# Patient Record
Sex: MALE | Race: WHITE
[De-identification: names, ages, dates, MRNs, and addresses within clinical notes are randomized per-mention and may not be internally consistent; named-entity substitution may affect disease eponyms.]

---

## 2017-05-15 ENCOUNTER — HOSPITAL ENCOUNTER (OUTPATIENT)
Dept: HOSPITAL 53 - M ED | Age: 82
Discharge: HOME | End: 2017-05-15
Attending: SURGERY
Payer: COMMERCIAL

## 2017-05-15 VITALS — HEIGHT: 69 IN | WEIGHT: 170 LBS | BODY MASS INDEX: 25.18 KG/M2

## 2017-05-15 VITALS — SYSTOLIC BLOOD PRESSURE: 190 MMHG | DIASTOLIC BLOOD PRESSURE: 86 MMHG

## 2017-05-15 DIAGNOSIS — T18.128A: Primary | ICD-10-CM
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
## 2017-05-15 DIAGNOSIS — Z88.8: ICD-10-CM

## 2017-05-15 DIAGNOSIS — E11.9: ICD-10-CM

## 2017-05-15 DIAGNOSIS — Z79.82: ICD-10-CM

## 2017-05-15 DIAGNOSIS — I73.9: ICD-10-CM

## 2017-05-15 DIAGNOSIS — K26.9: ICD-10-CM

## 2017-05-15 DIAGNOSIS — Z87.891: ICD-10-CM

## 2017-05-15 DIAGNOSIS — E78.5: ICD-10-CM

## 2017-05-15 DIAGNOSIS — G62.9: ICD-10-CM

## 2017-05-15 DIAGNOSIS — Z79.02: ICD-10-CM

## 2017-05-15 DIAGNOSIS — E07.9: ICD-10-CM

## 2017-05-15 DIAGNOSIS — I25.10: ICD-10-CM
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
## 2017-05-15 DIAGNOSIS — Z79.899: ICD-10-CM

## 2017-05-15 DIAGNOSIS — K22.10: ICD-10-CM

## 2017-05-15 DIAGNOSIS — I10: ICD-10-CM

## 2017-05-15 LAB
ANION GAP SERPL CALC-SCNC: 9 MEQ/L (ref 8–16)
BASOPHILS # BLD AUTO: 0.1 K/MM3 (ref 0–0.2)
BASOPHILS NFR BLD AUTO: 0.7 % (ref 0–1)
BUN SERPL-MCNC: 30 MG/DL (ref 7–18)
CALCIUM SERPL-MCNC: 8.9 MG/DL (ref 8.8–10.2)
CHLORIDE SERPL-SCNC: 103 MEQ/L (ref 98–107)
CO2 SERPL-SCNC: 30 MEQ/L (ref 21–32)
CREAT SERPL-MCNC: 1.38 MG/DL (ref 0.7–1.3)
EOSINOPHIL # BLD AUTO: 0.2 K/MM3 (ref 0–0.5)
EOSINOPHIL NFR BLD AUTO: 1.8 % (ref 0–3)
ERYTHROCYTE [DISTWIDTH] IN BLOOD BY AUTOMATED COUNT: 13.7 % (ref 11.5–14.5)
GFR SERPL CREATININE-BSD FRML MDRD: 52.1 ML/MIN/{1.73_M2} (ref 35–?)
GLUCOSE SERPL-MCNC: 191 MG/DL (ref 83–110)
LARGE UNSTAINED CELL #: 0.1 K/MM3 (ref 0–0.4)
LARGE UNSTAINED CELL %: 1.6 % (ref 0–4)
LYMPHOCYTES # BLD AUTO: 0.8 K/MM3 (ref 1.5–4.5)
LYMPHOCYTES NFR BLD AUTO: 8.8 % (ref 24–44)
MCH RBC QN AUTO: 30.4 PG (ref 27–33)
MCHC RBC AUTO-ENTMCNC: 33.5 G/DL (ref 32–36.5)
MCV RBC AUTO: 90.7 FL (ref 80–96)
MONOCYTES # BLD AUTO: 0.5 K/MM3 (ref 0–0.8)
MONOCYTES NFR BLD AUTO: 5.7 % (ref 0–5)
NEUTROPHILS # BLD AUTO: 7.4 K/MM3 (ref 1.8–7.7)
NEUTROPHILS NFR BLD AUTO: 81.5 % (ref 36–66)
PLATELET # BLD AUTO: 234 K/MM3 (ref 150–450)
POTASSIUM SERPL-SCNC: 3.9 MEQ/L (ref 3.5–5.1)
SODIUM SERPL-SCNC: 142 MEQ/L (ref 136–145)
WBC # BLD AUTO: 9 K/MM3 (ref 4–10)

## 2017-05-15 PROCEDURE — 85025 COMPLETE CBC W/AUTO DIFF WBC: CPT

## 2017-05-15 PROCEDURE — 96374 THER/PROPH/DIAG INJ IV PUSH: CPT

## 2017-05-15 PROCEDURE — 99284 EMERGENCY DEPT VISIT MOD MDM: CPT

## 2017-05-15 PROCEDURE — 80048 BASIC METABOLIC PNL TOTAL CA: CPT

## 2017-05-15 PROCEDURE — 43247 EGD REMOVE FOREIGN BODY: CPT

## 2017-05-15 NOTE — ROOR
________________________________________________________________________________

Patient Name: Shoaib Burdick           Procedure Date: 5/15/2017 6:13 PM

MRN: [id number]                          Account Number: [id number]

YOB: 1931              Age: 85

                                       Gender: Male

Note Status: Finalized                 

________________________________________________________________________________

 

Procedure:           Upper GI endoscopy

Indications:         Foreign body in the esophagus

Providers:           Leo J. Gosselin Jr, MD

Referring MD:        Maja Lundborg-Gray, MD

Requesting Provider: 

Medicines:           General Anesthesia

Complications:       No immediate complications.

________________________________________________________________________________

Procedure:           Pre-Anesthesia Assessment:

                     - Prior to the procedure, a History and Physical was 

                     performed, and patient medications and allergies were 

                     reviewed. The patient is competent. The risks and 

                     benefits of the procedure and the sedation options and 

                     risks were discussed with the patient. All questions were 

                     answered and informed consent was obtained. Patient 

                     identification and proposed procedure were verified by 

                     the physician and the nurse in the pre-procedure area and 

                     in the procedure room. Mental Status Examination: alert 

                     and oriented. Airway Examination: normal oropharyngeal 

                     airway and neck mobility. Respiratory Examination: clear 

                     to auscultation. CV Examination: normal. ASA Grade 

                     Assessment: II - A patient with mild systemic disease. 

                     After reviewing the risks and benefits, the patient was 

                     deemed in satisfactory condition to undergo the 

                     procedure. The anesthesia plan was to use moderate 

                     sedation / analgesia (conscious sedation). Immediately 

                     prior to administration of medications, the patient was 

                     re-assessed for adequacy to receive sedatives. The heart 

                     rate, respiratory rate, oxygen saturations, blood 

                     pressure, adequacy of pulmonary ventilation, and response 

                     to care were monitored throughout the procedure. The 

                     physical status of the patient was re-assessed after the 

                     procedure.

                     The Endoscope was introduced through the mouth, and 

                     advanced to the duodenal bulb. The upper GI endoscopy was 

                     accomplished without difficulty. The patient tolerated 

                     the procedure well.

                                                                                

Findings:

     The upper third of the esophagus and middle third of the esophagus were 

     normal.

     Food was found at the gastroesophageal junction. Removal of food was 

     accomplished.

     Many superficial esophageal ulcers with no bleeding were found in the 

     distal esophagus.

     The gastric fundus, gastric body, gastric antrum, prepyloric region of 

     the stomach and pylorus were normal.

     Few non-bleeding superficial duodenal ulcers were found in the duodenal 

     bulb.

                                                                                

Impression:          - Normal upper third of esophagus and middle third of 

                     esophagus.

                     - Food at the gastroesophageal junction. Removal was 

                     successful.

                     - Non-bleeding esophageal ulcers.

                     - Normal gastric fundus, gastric body, antrum, prepyloric 

                     region of the stomach and pylorus.

                     - Multiple non-bleeding duodenal ulcers.

Recommendation:      - Discharge patient to home (ambulatory).

                     - Return to my office in 1 week.

                                                                                

 

Leo Gosselin MD

_____________________

Leo J Gosselin Jr, MD

5/15/2017 7:27:16 PM

This report has been signed electronically.

Number of Addenda: 0

 

Note Initiated On: 5/15/2017 6:13 PM

Estimated Blood Loss:

     Estimated blood loss was minimal.

## 2020-08-29 ENCOUNTER — HOSPITAL ENCOUNTER (OUTPATIENT)
Dept: HOSPITAL 53 - M LAB | Age: 85
End: 2020-08-29
Attending: SURGERY
Payer: MEDICARE

## 2020-08-29 DIAGNOSIS — I70.211: ICD-10-CM

## 2020-08-29 DIAGNOSIS — D69.8: ICD-10-CM

## 2020-08-29 DIAGNOSIS — Z01.818: Primary | ICD-10-CM

## 2020-09-12 LAB
APTT BLD: 49.3 SECONDS (ref 25–38.4)
BASOPHILS # BLD AUTO: 0.1 10^3/UL (ref 0–0.2)
BASOPHILS NFR BLD AUTO: 0.7 % (ref 0–1)
EOSINOPHIL # BLD AUTO: 0.4 10^3/UL (ref 0–0.5)
EOSINOPHIL NFR BLD AUTO: 6.1 % (ref 0–3)
HCT VFR BLD AUTO: 41 % (ref 42–52)
HGB BLD-MCNC: 13.4 G/DL (ref 13.5–17.5)
INR PPP: 1.02
LYMPHOCYTES # BLD AUTO: 1 10^3/UL (ref 1.5–5)
LYMPHOCYTES NFR BLD AUTO: 13.9 % (ref 24–44)
MCH RBC QN AUTO: 29.3 PG (ref 27–33)
MCHC RBC AUTO-ENTMCNC: 32.7 G/DL (ref 32–36.5)
MCV RBC AUTO: 89.7 FL (ref 80–96)
MONOCYTES # BLD AUTO: 0.7 10^3/UL (ref 0–0.8)
MONOCYTES NFR BLD AUTO: 9.8 % (ref 0–5)
NEUTROPHILS # BLD AUTO: 5 10^3/UL (ref 1.5–8.5)
NEUTROPHILS NFR BLD AUTO: 68.9 % (ref 36–66)
PLATELET # BLD AUTO: 234 10^3/UL (ref 150–450)
PROTHROMBIN TIME: 13.6 SECONDS (ref 11.8–14)
RBC # BLD AUTO: 4.57 10^6/UL (ref 4.3–6.1)
WBC # BLD AUTO: 7.3 10^3/UL (ref 4–10)

## 2020-09-25 LAB
BUN SERPL-MCNC: 33 MG/DL (ref 7–18)
CALCIUM SERPL-MCNC: 9 MG/DL (ref 8.8–10.2)
CHLORIDE SERPL-SCNC: 102 MEQ/L (ref 98–107)
CO2 SERPL-SCNC: 32 MEQ/L (ref 21–32)
CREAT SERPL-MCNC: 1.29 MG/DL (ref 0.7–1.3)
GFR SERPL CREATININE-BSD FRML MDRD: 56 ML/MIN/{1.73_M2} (ref 35–?)
GLUCOSE SERPL-MCNC: 203 MG/DL (ref 70–100)
POTASSIUM SERPL-SCNC: 4.2 MEQ/L (ref 3.5–5.1)
SODIUM SERPL-SCNC: 140 MEQ/L (ref 136–145)

## 2020-10-14 ENCOUNTER — HOSPITAL ENCOUNTER (INPATIENT)
Dept: HOSPITAL 53 - M ED | Age: 85
LOS: 9 days | Discharge: HOME HEALTH | DRG: 854 | End: 2020-10-23
Attending: INTERNAL MEDICINE | Admitting: INTERNAL MEDICINE
Payer: MEDICARE

## 2020-10-14 VITALS — BODY MASS INDEX: 23.31 KG/M2 | WEIGHT: 157.41 LBS | HEIGHT: 69 IN

## 2020-10-14 VITALS — DIASTOLIC BLOOD PRESSURE: 60 MMHG | SYSTOLIC BLOOD PRESSURE: 126 MMHG

## 2020-10-14 VITALS — SYSTOLIC BLOOD PRESSURE: 105 MMHG | DIASTOLIC BLOOD PRESSURE: 51 MMHG

## 2020-10-14 DIAGNOSIS — E11.52: ICD-10-CM

## 2020-10-14 DIAGNOSIS — Z79.01: ICD-10-CM

## 2020-10-14 DIAGNOSIS — E78.5: ICD-10-CM

## 2020-10-14 DIAGNOSIS — E87.2: ICD-10-CM

## 2020-10-14 DIAGNOSIS — K21.9: ICD-10-CM

## 2020-10-14 DIAGNOSIS — I48.91: ICD-10-CM

## 2020-10-14 DIAGNOSIS — L89.156: ICD-10-CM

## 2020-10-14 DIAGNOSIS — Z87.891: ICD-10-CM

## 2020-10-14 DIAGNOSIS — E11.69: ICD-10-CM

## 2020-10-14 DIAGNOSIS — Z95.820: ICD-10-CM

## 2020-10-14 DIAGNOSIS — E11.40: ICD-10-CM

## 2020-10-14 DIAGNOSIS — B35.1: ICD-10-CM

## 2020-10-14 DIAGNOSIS — Z79.02: ICD-10-CM

## 2020-10-14 DIAGNOSIS — M86.171: ICD-10-CM

## 2020-10-14 DIAGNOSIS — Z66: ICD-10-CM

## 2020-10-14 DIAGNOSIS — E87.6: ICD-10-CM

## 2020-10-14 DIAGNOSIS — A41.9: Primary | ICD-10-CM

## 2020-10-14 DIAGNOSIS — L89.322: ICD-10-CM

## 2020-10-14 DIAGNOSIS — Z79.84: ICD-10-CM

## 2020-10-14 DIAGNOSIS — Z79.899: ICD-10-CM

## 2020-10-14 DIAGNOSIS — I10: ICD-10-CM

## 2020-10-14 DIAGNOSIS — N40.0: ICD-10-CM

## 2020-10-14 DIAGNOSIS — E11.65: ICD-10-CM

## 2020-10-14 DIAGNOSIS — L03.115: ICD-10-CM

## 2020-10-14 DIAGNOSIS — E03.9: ICD-10-CM

## 2020-10-14 DIAGNOSIS — L97.519: ICD-10-CM

## 2020-10-14 LAB
ALBUMIN SERPL BCG-MCNC: 2.2 GM/DL (ref 3.2–5.2)
ALT SERPL W P-5'-P-CCNC: 77 U/L (ref 12–78)
ANISOCYTOSIS BLD QL SMEAR: (no result)
APTT BLD: 31.2 SECONDS (ref 24.2–38.5)
BILIRUB CONJ SERPL-MCNC: 0.5 MG/DL (ref 0–0.2)
BILIRUB SERPL-MCNC: 0.9 MG/DL (ref 0.2–1)
BUN SERPL-MCNC: 50 MG/DL (ref 7–18)
CALCIUM SERPL-MCNC: 8.9 MG/DL (ref 8.8–10.2)
CHLORIDE SERPL-SCNC: 92 MEQ/L (ref 98–107)
CK MB CFR.DF SERPL CALC: 1.43
CK MB CFR.DF SERPL CALC: 1.96
CK MB SERPL-MCNC: 1.2 NG/ML (ref ?–3.6)
CK MB SERPL-MCNC: < 1 NG/ML (ref ?–3.6)
CK SERPL-CCNC: 51 U/L (ref 39–308)
CK SERPL-CCNC: 84 U/L (ref 39–308)
CO2 SERPL-SCNC: 32 MEQ/L (ref 21–32)
CREAT SERPL-MCNC: 1.54 MG/DL (ref 0.7–1.3)
GFR SERPL CREATININE-BSD FRML MDRD: 45.6 ML/MIN/{1.73_M2} (ref 35–?)
GLUCOSE SERPL-MCNC: 390 MG/DL (ref 70–100)
HCT VFR BLD AUTO: 40.4 % (ref 42–52)
HGB BLD-MCNC: 12.7 G/DL (ref 13.5–17.5)
INR PPP: 1.31
LIPASE SERPL-CCNC: 61 U/L (ref 73–393)
LYMPHOCYTES NFR BLD MANUAL: 9 % (ref 16–44)
MCH RBC QN AUTO: 26.8 PG (ref 27–33)
MCHC RBC AUTO-ENTMCNC: 31.4 G/DL (ref 32–36.5)
MCV RBC AUTO: 85.2 FL (ref 80–96)
MONOCYTES NFR BLD MANUAL: 3 % (ref 0–5)
NEUTROPHILS NFR BLD MANUAL: 83 % (ref 28–66)
NT-PRO BNP: 4723 PG/ML (ref ?–450)
PLATELET # BLD AUTO: 254 10^3/UL (ref 150–450)
PLATELET BLD QL SMEAR: NORMAL
POTASSIUM SERPL-SCNC: 3.5 MEQ/L (ref 3.5–5.1)
PROT SERPL-MCNC: 6.2 GM/DL (ref 6.4–8.2)
PROTHROMBIN TIME: 16.6 SECONDS (ref 12.5–14.3)
RBC # BLD AUTO: 4.74 10^6/UL (ref 4.3–6.1)
SODIUM SERPL-SCNC: 136 MEQ/L (ref 136–145)
T4 FREE SERPL-MCNC: 1.45 NG/DL (ref 0.76–1.46)
TROPONIN I SERPL-MCNC: 0.03 NG/ML (ref ?–0.1)
TROPONIN I SERPL-MCNC: < 0.02 NG/ML (ref ?–0.1)
TSH SERPL DL<=0.005 MIU/L-ACNC: 0.53 UIU/ML (ref 0.36–3.74)
VARIANT LYMPHS NFR BLD MANUAL: 2 % (ref 0–5)
WBC # BLD AUTO: 3.7 10^3/UL (ref 4–10)
WBC TOXIC VACUOLES BLD QL SMEAR: (no result)

## 2020-10-14 RX ADMIN — INSULIN LISPRO SCH UNITS: 100 INJECTION, SOLUTION INTRAVENOUS; SUBCUTANEOUS at 22:38

## 2020-10-14 RX ADMIN — DEXTROSE MONOHYDRATE SCH MLS/HR: 5 INJECTION INTRAVENOUS at 22:39

## 2020-10-14 RX ADMIN — PREGABALIN SCH MG: 100 CAPSULE ORAL at 22:39

## 2020-10-14 RX ADMIN — ENOXAPARIN SODIUM SCH MG: 80 INJECTION SUBCUTANEOUS at 22:38

## 2020-10-14 RX ADMIN — PRAVASTATIN SODIUM SCH MG: 20 TABLET ORAL at 22:39

## 2020-10-14 RX ADMIN — SODIUM CHLORIDE SCH MLS/HR: 9 INJECTION, SOLUTION INTRAVENOUS at 18:15

## 2020-10-14 NOTE — HPEPDOC
[facility] Medical History & Physical


Date of Admission


Oct 14, 2020


Date of Service:  Oct 14, 2020





History and Physical


Shoaib Burdick


Chief complaint:


Who presented to the hospital after expressing 3-4 days of aches and pains





History of present illness:


Patient is an 88-year-old  male with a PMHx of HTN, NIDDM2, PVD (s/p 

RLE femoral-popliteal bypass), Hx of Chronic RLE digit necrosis, DLP, 

Hypothyroidism, Neuropathy, BPH, GERD who presented to the hospital after 

expressing 3-4 days of aches and pains all over his body.





Patient denies any chest pain, shortness of breath, palpitations, nausea, 

vomiting, abdominal pain, constipation, diarrhea or discomfort with urination. 

He does report a cough with some sputum production. 





In the emergency room, patient was found to be hypotensive, tachycardic, 

febrile, and leukopenic. Hospital services called for further evaluation.





Denies any changes in weight or appetite





Past Medical History:


HTN, NIDDM2, PVD (s/p RLE femoral-popliteal bypass), Hx of Chronic RLE digit 

necrosis, DLP, Hypothyroidism, Neuropathy, BPH, GERD





Past Surgical History:


Patient reports that his only surgery was femoral-popliteal bypass completed 

approximately 2 weeks ago across hospital; Dr. Mabry 





Allergies:


See below 





Medications:


See below 





Family History:


- Father with a history of lung cancer, bladder cancer, throat cancer





Social History: 


- Denies the use of alcohol or illicit drugs; patient reports that he quit 

smoking several years ago, was a smoker of 25 years


- Denies recent travel or sick contacts


- Lives alone, 


- Occupation; she reports that he used to work at a [employer]





Review of Systems:


10 point review of systems complete, all negative otherwise stated in HPI





Physical exam:


- Vitals: BP [96/55], HR [90], RR [26], Sat [96%RA], Temp [103.5]


- General: Lying in bed, Speaking in full sentences, AAOx3


- HEENT: NC, AT, PERRLA


- CVS: Tachycardic, +S1S2


- Lungs: Fair air entry bilaterally, No appreciable wheezing / rales / rhonchi 


- Abdomen: Soft, Non-distended, Non-tender


- Extremities: No calf tenderness, right leg with incision on medial aspect of 

calf, right foot, first, second digit necrosis


- Neuro: No focal motor or sensory deficit





Assessment and Plan: 


Sepsis - likely 2/2 Cellulitis/osteomyelitis of RLE


- Patient reports that he has had a recent vascular intervention across hospital

 2 weeks ago


- Patient and daughters have refused transfer to [facility] for further 

intervention


- In the ER, patient was hypotensive, tachycardic and febrile with a T-max of 

103.5


- Physical reveals necrosis of the right foot, first and second digit; report a 

chronic; surrounding erythema, warmth and odor


- UA negative


- CXR 10/14: No acute findings.


- Will check blood cultures, wound cultures, pro-calcitonin lactic acid


- Will place patient in ICU


- Will start aggressive IV fluid hydration and broad-spectrum antibiotics with 

vancomycin and Zosyn


- Discussed case with podiatry, Dr. Plaza; will be on consultation





CARRIE keys with RVR


- In the emergency room, patient was found to have an elevated heart rate


- EKG consistent with atrial fibrillation with RVR, heart rate of 105


- Troponin x1 negative; will continue to trend


- Will continue telemetry monitoring 


- Patients rate has improved with IV fluid hydration


- Will start full into regulation with Lovenox, renally adjusted





HTN


- Patients hypotensive emergency room


- Will hold home blood pressure medications; Carvedilol and amlodipine


- c/w IV fluid hydration 





NIDDM2


- Will start ISS





PVD (s/p RLE femoral-popliteal bypass)


- c/w Plavix; Will hold Xarelto 2.5 mg daily 





DLP


- c/w Pravastatin 





Hypothyroidism


- c/w Levothyroxine 





Neuropathy


- c/w Lyrica





BPH


- c/w Tamsulosin 





GI prophylaxis / GERD


- Will start Protonix





DVT prophylaxis 


- Will start renal dosed Lovenox (re: CARRIE keys)





Code status:


- DNR / DNI





Disposition:


- Discuss case with daughter/healthcare proxy Kate Yanez [phone]; I have

 updated her about her care plan, addressed all her questions and concerns





Critical care time spent; 65 minutes





Vital Signs





Vital Signs








  Date Time  Temp Pulse Resp B/P (MAP) Pulse Ox O2 Delivery O2 Flow Rate FiO2


 


10/14/20 18:09     36   


 


10/14/20 18:06    96/55 (69)    


 


10/14/20 17:45  90      


 


10/14/20 17:14 102.5       


 


10/14/20 15:41   36   Room Air  











Laboratory Data


Labs 24H


Laboratory Tests 2


10/14/20 14:08: 


Immature Granulocyte % (Auto) , Neutrophils (%) (Auto) , Nucleated Red Blood 

Cells % (auto) 0.0, Neutrophils 83H, Band Neutrophils 3, Lymphocytes (Manual) 

9L, Monocytes (Manual) 3, Atypical Lymphocytes 2, Red Blood Cell Morphology , 

Anisocytosis 1+, Toxic Vacuolation 1+, Platelet Estimate NORMAL, Prothrombin 

Time 16.6H, Prothromb Time International Ratio 1.31, Activated Partial 

Thromboplast Time 31.2, Anion Gap 12, Glomerular Filtration Rate 45.6, Calcium 

Level 8.9, Total Bilirubin 0.9, Direct Bilirubin 0.5H, Aspartate Amino Transf 

(AST/SGOT) 53H, Alanine Aminotransferase (ALT/SGPT) 77, Alkaline Phosphatase 

179H, Total Creatine Kinase 84, Creatine Kinase MB 1.2, Creatine Kinase MB 

Relative Index 1.43, Troponin I < 0.02, NT-Pro-B-Type Natriuretic Peptide 4723H,

 Total Protein 6.2L, Albumin 2.2L, Albumin/Globulin Ratio 0.6, Lipase 61L, 

Thyroid Stimulating Hormone (TSH) 0.528, Free Thyroxine 1.45


10/14/20 15:27: 


Urine Color YELLOW, Urine Appearance HAZY, Urine pH 5.0, Urine Specific Gravity 

1.009, Urine Protein 1+H, Urine Glucose (UA) 3+H, Urine Ketones NEGATIVE, Urine 

Blood 1+H, Urine Nitrite NEGATIVE, Urine Bilirubin NEGATIVE, Urine Urobilinogen 

0.2, Urine Leukocyte Esterase NEGATIVE, Urine WBC (Auto) 2, Urine RBC (Auto) 3, 

Urine Hyaline Casts (Auto) 0, Urine Bacteria (Auto) NEGATIVE, Urine Squamous 

Epithelial Cells 0, Urine Amorphous Sediment SMALLH, Urine Mucus (Auto) SMALL, 

Urine Sperm (Auto)


CBC/BMP


Laboratory Tests


10/14/20 14:08








Microbiology





Microbiology


10/14/20 Gram Stain - Final, Resulted


           


10/14/20 Wound Culture, Resulted


           Pending


10/14/20 Blood Culture, Received


           Pending





Home Medications


Scheduled


Amlodipine Besylate (Amlodipine Besylate) 5 Mg Tab, 5 MG PO DAILY


Carvedilol (Carvedilol) 3.125 Mg Tab, 3.125 MG PO BID


Clopidogrel Bisulfate (Clopidogrel) 75 Mg Tablet, 75 MG PO DAILY


Doxycycline Hyclate (Doxycycline Hyclate) 100 Mg Capsule, 100 MG PO BID


   FOR 10 DAYS, FILLED 10/9 


Furosemide (Furosemide) 40 Mg Tab, 40 MG PO DAILY


Glipizide (Glipizide Xl) 2.5 Mg Tab, 2.5 MG PO DAILY


Levothyroxine Sodium (Synthroid) 50 Mcg Tablet, 50 MCG PO DAILY


Pravastatin Sodium (Pravachol) 20 Mg Tab, 20 MG PO QHS


Pregabalin (Lyrica) 100 Mg Cap, 100 MG PO TID


Rivaroxaban (Xarelto) 2.5 Mg Tablet, 2.5 MG PO BID


Tamsulosin HCl (Flomax) 0.4 Mg Cap, 0.8 MG PO DAILY





Scheduled PRN


Acetaminophen (Acetaminophen) 325 Mg Tab, 650 MG PO Q6H PRN for PAIN





Miscellaneous Medications


[Med Rec Comment]


   LIST OBTAINED FROM PHARMACY, PT STATES HE TOOK ALL OF HIS MEDS THIS MORNING 





Allergies


Coded Allergies:  


     Unclassified (Verified  Adverse Reaction, Mild, legs ache, 10/14/20)


   


   patient unsure of what allergies he has











JAKE ROWAN MD                Oct 14, 2020 18:40

## 2020-10-14 NOTE — REPVR
PROCEDURE INFORMATION: 

Exam: CT Head Without Contrast 

Exam date and time: 10/14/2020 2:16 PM 

Age: 88 years old 

Clinical indication: Other: General weakness 



TECHNIQUE: 

Imaging protocol: Computed tomography of the head without contrast. 

Radiation optimization: All CT scans at this facility use at least one of these 

dose optimization techniques: automated exposure control; mA and/or kV 

adjustment per patient size (includes targeted exams where dose is matched to 

clinical indication); or iterative reconstruction. 



COMPARISON: 

No relevant prior studies available. 



FINDINGS: 

Brain: There is moderate central and cortical atrophy and mild small vessel 

ischemic disease. There is no intracranial hemorrhage. 

Cerebral ventricles: No ventriculomegaly. 

Bones/joints: Unremarkable. No acute fracture. 

Paranasal sinuses: Visualized sinuses are unremarkable. No fluid levels. 

Mastoid air cells: Visualized mastoid air cells are well aerated. 

Soft tissues: Unremarkable. 



IMPRESSION: 

No acute intracranial abnormality. 



Electronically signed by: Sagar Zambrano On 10/14/2020  14:30:20 PM

## 2020-10-14 NOTE — REPVR
PROCEDURE INFORMATION: 

Exam: XR Chest, 1 View 

Exam date and time: 10/14/2020 1:38 PM 

Age: 88 years old 

Clinical indication: Dyspnea; Additional info: Weakness 



TECHNIQUE: 

Imaging protocol: XR of the chest 

Views: 1 view. 



COMPARISON: 

No relevant prior studies available. 



FINDINGS: 

Lungs: Unremarkable. No consolidation. 

Pleural space: Unremarkable. No pleural effusion. No pneumothorax. 

Heart/Mediastinum: Unremarkable. No cardiomegaly. 

Bones/joints: Unremarkable. 

Soft tissues: There is a small nipple shadow on the right. 



IMPRESSION: 

No acute findings. 



Electronically signed by: Sagar Zambrano On 10/14/2020  14:10:24 PM

## 2020-10-15 VITALS — DIASTOLIC BLOOD PRESSURE: 52 MMHG | SYSTOLIC BLOOD PRESSURE: 105 MMHG

## 2020-10-15 VITALS — SYSTOLIC BLOOD PRESSURE: 120 MMHG | DIASTOLIC BLOOD PRESSURE: 56 MMHG

## 2020-10-15 VITALS — SYSTOLIC BLOOD PRESSURE: 126 MMHG | DIASTOLIC BLOOD PRESSURE: 58 MMHG

## 2020-10-15 VITALS — SYSTOLIC BLOOD PRESSURE: 118 MMHG | DIASTOLIC BLOOD PRESSURE: 59 MMHG

## 2020-10-15 VITALS — SYSTOLIC BLOOD PRESSURE: 91 MMHG | DIASTOLIC BLOOD PRESSURE: 48 MMHG

## 2020-10-15 VITALS — SYSTOLIC BLOOD PRESSURE: 107 MMHG | DIASTOLIC BLOOD PRESSURE: 52 MMHG

## 2020-10-15 VITALS — SYSTOLIC BLOOD PRESSURE: 111 MMHG | DIASTOLIC BLOOD PRESSURE: 56 MMHG

## 2020-10-15 VITALS — SYSTOLIC BLOOD PRESSURE: 94 MMHG | DIASTOLIC BLOOD PRESSURE: 53 MMHG

## 2020-10-15 VITALS — SYSTOLIC BLOOD PRESSURE: 97 MMHG | DIASTOLIC BLOOD PRESSURE: 54 MMHG

## 2020-10-15 VITALS — SYSTOLIC BLOOD PRESSURE: 111 MMHG | DIASTOLIC BLOOD PRESSURE: 53 MMHG

## 2020-10-15 VITALS — SYSTOLIC BLOOD PRESSURE: 111 MMHG | DIASTOLIC BLOOD PRESSURE: 54 MMHG

## 2020-10-15 VITALS — SYSTOLIC BLOOD PRESSURE: 97 MMHG | DIASTOLIC BLOOD PRESSURE: 51 MMHG

## 2020-10-15 VITALS — SYSTOLIC BLOOD PRESSURE: 106 MMHG | DIASTOLIC BLOOD PRESSURE: 57 MMHG

## 2020-10-15 LAB
ALBUMIN SERPL BCG-MCNC: 1.5 GM/DL (ref 3.2–5.2)
ALT SERPL W P-5'-P-CCNC: 53 U/L (ref 12–78)
ANISOCYTOSIS BLD QL SMEAR: (no result)
BILIRUB SERPL-MCNC: 0.8 MG/DL (ref 0.2–1)
BUN SERPL-MCNC: 45 MG/DL (ref 7–18)
BUN SERPL-MCNC: 48 MG/DL (ref 7–18)
CALCIUM SERPL-MCNC: 7.2 MG/DL (ref 8.8–10.2)
CALCIUM SERPL-MCNC: 7.3 MG/DL (ref 8.8–10.2)
CHLORIDE SERPL-SCNC: 103 MEQ/L (ref 98–107)
CHLORIDE SERPL-SCNC: 107 MEQ/L (ref 98–107)
CK MB CFR.DF SERPL CALC: 2.56
CK MB SERPL-MCNC: < 1 NG/ML (ref ?–3.6)
CK SERPL-CCNC: 39 U/L (ref 39–308)
CO2 SERPL-SCNC: 28 MEQ/L (ref 21–32)
CO2 SERPL-SCNC: 29 MEQ/L (ref 21–32)
CREAT SERPL-MCNC: 1.13 MG/DL (ref 0.7–1.3)
CREAT SERPL-MCNC: 1.18 MG/DL (ref 0.7–1.3)
CRP SERPL-MCNC: 17.9 MG/DL (ref 0–0.3)
ERYTHROCYTE [SEDIMENTATION RATE] IN BLOOD BY WESTERGREN METHOD: 51 MM/HR (ref 0–20)
EST. AVERAGE GLUCOSE BLD GHB EST-MCNC: 166 MG/DL (ref 60–110)
GFR SERPL CREATININE-BSD FRML MDRD: > 60 ML/MIN/{1.73_M2} (ref 35–?)
GFR SERPL CREATININE-BSD FRML MDRD: > 60 ML/MIN/{1.73_M2} (ref 35–?)
GLUCOSE SERPL-MCNC: 231 MG/DL (ref 70–100)
GLUCOSE SERPL-MCNC: 413 MG/DL (ref 70–100)
HCT VFR BLD AUTO: 30.6 % (ref 42–52)
HCT VFR BLD AUTO: 31.6 % (ref 42–52)
HGB BLD-MCNC: 10 G/DL (ref 13.5–17.5)
HGB BLD-MCNC: 10.3 G/DL (ref 13.5–17.5)
LYMPHOCYTES NFR BLD MANUAL: 6 % (ref 16–44)
MAGNESIUM SERPL-MCNC: 1.9 MG/DL (ref 1.8–2.4)
MAGNESIUM SERPL-MCNC: 2 MG/DL (ref 1.8–2.4)
MCH RBC QN AUTO: 27.8 PG (ref 27–33)
MCH RBC QN AUTO: 27.9 PG (ref 27–33)
MCHC RBC AUTO-ENTMCNC: 32.6 G/DL (ref 32–36.5)
MCHC RBC AUTO-ENTMCNC: 32.7 G/DL (ref 32–36.5)
MCV RBC AUTO: 85.2 FL (ref 80–96)
MCV RBC AUTO: 85.4 FL (ref 80–96)
METAMYELOCYTES NFR BLD MANUAL: 1 % (ref 0–0)
MONOCYTES NFR BLD MANUAL: 2 % (ref 0–5)
NEUTROPHILS NFR BLD MANUAL: 91 % (ref 28–66)
OVALOCYTES BLD QL SMEAR: (no result)
PLATELET # BLD AUTO: 126 10^3/UL (ref 150–450)
PLATELET # BLD AUTO: 144 10^3/UL (ref 150–450)
PLATELET BLD QL SMEAR: NORMAL
POTASSIUM SERPL-SCNC: 2.6 MEQ/L (ref 3.5–5.1)
POTASSIUM SERPL-SCNC: 3.2 MEQ/L (ref 3.5–5.1)
PROT SERPL-MCNC: 4.4 GM/DL (ref 6.4–8.2)
RBC # BLD AUTO: 3.59 10^6/UL (ref 4.3–6.1)
RBC # BLD AUTO: 3.7 10^6/UL (ref 4.3–6.1)
SODIUM SERPL-SCNC: 140 MEQ/L (ref 136–145)
SODIUM SERPL-SCNC: 141 MEQ/L (ref 136–145)
TROPONIN I SERPL-MCNC: 0.02 NG/ML (ref ?–0.1)
WBC # BLD AUTO: 11.5 10^3/UL (ref 4–10)
WBC # BLD AUTO: 11.6 10^3/UL (ref 4–10)

## 2020-10-15 RX ADMIN — ENOXAPARIN SODIUM SCH MG: 80 INJECTION SUBCUTANEOUS at 22:00

## 2020-10-15 RX ADMIN — INSULIN LISPRO SCH UNITS: 100 INJECTION, SOLUTION INTRAVENOUS; SUBCUTANEOUS at 13:40

## 2020-10-15 RX ADMIN — ACETAMINOPHEN PRN MG: 325 TABLET ORAL at 22:31

## 2020-10-15 RX ADMIN — POTASSIUM CHLORIDE AND SODIUM CHLORIDE SCH MLS/HR: 900; 300 INJECTION, SOLUTION INTRAVENOUS at 18:52

## 2020-10-15 RX ADMIN — INSULIN LISPRO SCH UNITS: 100 INJECTION, SOLUTION INTRAVENOUS; SUBCUTANEOUS at 22:22

## 2020-10-15 RX ADMIN — INSULIN DETEMIR SCH UNITS: 100 INJECTION, SOLUTION SUBCUTANEOUS at 09:19

## 2020-10-15 RX ADMIN — INSULIN LISPRO SCH UNITS: 100 INJECTION, SOLUTION INTRAVENOUS; SUBCUTANEOUS at 09:20

## 2020-10-15 RX ADMIN — CLOPIDOGREL BISULFATE SCH MG: 75 TABLET ORAL at 09:18

## 2020-10-15 RX ADMIN — DEXTROSE MONOHYDRATE SCH MLS/HR: 5 INJECTION INTRAVENOUS at 11:10

## 2020-10-15 RX ADMIN — DEXTROSE MONOHYDRATE SCH MLS/HR: 5 INJECTION INTRAVENOUS at 22:24

## 2020-10-15 RX ADMIN — INSULIN DETEMIR SCH UNITS: 100 INJECTION, SOLUTION SUBCUTANEOUS at 22:23

## 2020-10-15 RX ADMIN — INSULIN LISPRO SCH UNITS: 100 INJECTION, SOLUTION INTRAVENOUS; SUBCUTANEOUS at 18:52

## 2020-10-15 RX ADMIN — DEXTROSE MONOHYDRATE SCH MLS/HR: 5 INJECTION INTRAVENOUS at 05:23

## 2020-10-15 RX ADMIN — PREGABALIN SCH MG: 100 CAPSULE ORAL at 16:19

## 2020-10-15 RX ADMIN — PREGABALIN SCH MG: 100 CAPSULE ORAL at 22:23

## 2020-10-15 RX ADMIN — POTASSIUM CHLORIDE AND SODIUM CHLORIDE SCH MLS/HR: 900; 300 INJECTION, SOLUTION INTRAVENOUS at 09:20

## 2020-10-15 RX ADMIN — ACETAMINOPHEN PRN MG: 325 TABLET ORAL at 16:19

## 2020-10-15 RX ADMIN — SODIUM CHLORIDE SCH MLS/HR: 9 INJECTION, SOLUTION INTRAVENOUS at 01:40

## 2020-10-15 RX ADMIN — PREGABALIN SCH MG: 100 CAPSULE ORAL at 09:18

## 2020-10-15 RX ADMIN — LEVOTHYROXINE SODIUM SCH MCG: 50 TABLET ORAL at 06:07

## 2020-10-15 RX ADMIN — DEXTROSE MONOHYDRATE SCH MLS/HR: 50 INJECTION, SOLUTION INTRAVENOUS at 13:40

## 2020-10-15 RX ADMIN — PRAVASTATIN SODIUM SCH MG: 20 TABLET ORAL at 22:23

## 2020-10-15 RX ADMIN — TAMSULOSIN HYDROCHLORIDE SCH MG: 0.4 CAPSULE ORAL at 09:18

## 2020-10-15 RX ADMIN — ACETAMINOPHEN PRN MG: 325 TABLET ORAL at 11:11

## 2020-10-15 RX ADMIN — DEXTROSE MONOHYDRATE SCH MLS/HR: 5 INJECTION INTRAVENOUS at 17:50

## 2020-10-15 NOTE — CR
DATE OF CONSULTATION:  10/15/2020



CHIEF COMPLAINT:  An 88-year-old white male, seen for evaluation of a necrotic 
big toe on his left foot. Patient complains of diffuse pain and achiness the 
last few days. Patient was subsequently admitted to [facility] and
is seen for evaluation. 



PAST MEDICAL HISTORY: 

1. Hypertension.

2. Non-insulin-dependent diabetes mellitus.

3. Peripheral arterial disease.

4. Status post femoral-popliteal bypass on the right side.

5. History of chronic ulceration of the right hallux with necrotic toe.

6. Hypothyroidism. 

7. Diabetic neuropathy.

8. Benign prostatic hypertrophy.

9. Gastroesophageal reflux disease.



PAST SURGICAL HISTORY: Femoral-popliteal bypass status post 2 weeks ago..



MEDICATIONS:

- amlodipine 5 mg by mouth daily 

- carvedilol 3.125 mg by mouth twice a day 

- clopidogrel 75 mg daily 

- doxycycline 100 mg by mouth twice a day 

- furosemide 40 mg daily 

- glipizide 2.5 mg daily 

- levothyroxine 50 mcg by mouth daily 

- pravastatin 20 mg by mouth every night 

- Lyrica 100 mg three times a day 



ALLERGIES: Unknown.



PHYSICAL EXAMINATION:  

Reveals an alert, well-oriented 88-year-old male in no acute distress. 
Evaluation of his foot reveals a necrotic right hallux with some discharge 
proximal at the necrosis site. The nails are thick and dystrophic times nine. 
Patient is missing the hallux nail plate on his right hallux. Dorsalis pedis and
posterior tibial pulses are not palpable bilateral. Popliteal pulse is palpable 
on the left side. It is not palpable on the right side. 



ASSESSMENT:  

1. Necrotic right hallux with infection.

2. Onychomycosis times nine.

3. Diabetes with severe peripheral arterial disease.



PLAN:  Debride nails manually with electric bur times nine. Patient is scheduled
for a 1st ray amputation of the right foot. His questions were answered. 
Informed consent was obtained and signed by the patient.



Thank you for this consultation. 

JANET

## 2020-10-15 NOTE — IPNPDOC
Text Note


Date of Service


The patient was seen on 10/15/20.





NOTE


Subjective:


Patient is an 88-year-old  male with a PMHx of HTN, NIDDM2, PVD (s/p 

RLE femoral-popliteal bypass), Hx of Chronic RLE digit necrosis, DLP, Hy

pothyroidism, Neuropathy, BPH, GERD who presented to the hospital after 

experiencing 3-4 days of aches and pains all over his body. Patient was admitted

to the hospital service for suspected sepsis secondary to his right lower 

extremities cellulitis/osteomyelitis. Patient has had first digit necrosis / 

gangrene.





Patient was seen and examined at the bedside. Currently patient reports that he 

feels fine. Denies any nausea, vomiting, chest pain, shortness breath, 

palpitations, abdominal pain, diarrhea, or urinary discomfort. Patient reports 

that he doesn't have much sensation of his right foot.





Objective:


Vitals (See below)


General: Lying in bed, comfortable, AAOx3


HEENT: NC, AT


CVS: +S1S2


Lungs: Fair air entry b/l, no appreciable wheezing, rhonchi or rales


Abdomen: Soft, nondistended, nontender


Extremities: No evidence of edema, - Calf tenderness, right first digit dry 

gangrene surrounding erythema, warmth and odor 





Assessment and plan:


Sepsis - likely 2/2 Cellulitis/osteomyelitis / Gangrene of R foot 1st digit of 

RLE


- Clinically patient reports that he's feeling better than he did yesterday


- Patient and daughters had refused transfer to [facility] for further 

intervention (recent intervention 2 weeks ago)


- Hemodynamically stable / patient has remained afebrile over the last 12 hours


- UA negative


- Patient has leukocytosis with neutrophil predominance


- Lactic acidosis resolved


- CXR 10/14: No acute findings.


- Blood cultures 10/14: Pending; Wound cultures 10/14: No growth


- PCT pending 


- c/w Normal saline and broad-spectrum antibiotics with vancomycin and Zosyn 

(Day #2)


- Podiatry, Dr. Plaza on consultation; will be taken to OR today for 

amputation of RLE first digit and possible metatarsal


- Will downgrade patient to PCU





s/p A. fib with RVR


- Upon presentation, patient had an elevated heart rate in the emergency room


- Troponin trend 3, has remained negative


- EKG consistent with atrial fibrillation with RVR, heart rate of 105


- ECHO complete; report pending 


- c/w telemetry monitoring 


- Rate improved without medications 


- c/w Lovenox, renally adjusted therapeutic dosing 





s/p Lactic acidosis


- Will check lactic acid this morning





Hypokalemia


- Will supplement via PO route 





HTN


- Patients hypotensive emergency room


- Will hold home blood pressure medications; Carvedilol and amlodipine


- c/w IV fluid hydration 





NIDDM2 with Hyperglycemia 


- c/w ISS


- Will add Levemir





PVD (s/p RLE femoral-popliteal bypass)


- c/w Plavix; Will hold Xarelto 2.5 mg daily 


- c/w Lovenox 





DLP


- c/w Pravastatin 





Hypothyroidism


- c/w Levothyroxine 





Neuropathy


- c/w Lyrica





BPH


- c/w Tamsulosin 





GI prophylaxis / GERD


- c/w Protonix





DVT prophylaxis 


- c/w Lovenox therapeutic (re: CARRIE keys)





Code status:


- DNR / DNI





Disposition:


- Daughter/healthcare proxy Kate Yanez [phone]





VS,Ivonnee, I+O


VS, Fishbone, I+O


Laboratory Tests


10/14/20 14:08








10/15/20 04:22











Vital Signs








  Date Time  Temp Pulse Resp B/P (MAP) Pulse Ox O2 Delivery O2 Flow Rate FiO2


 


10/15/20 06:00  69  94/53 (67) 95 Room Air  


 


10/15/20 04:00 98.2  18     














I&O- Last 24 Hours up to 6 AM 


 


 10/15/20





 06:00


 


Intake Total 3605 ml


 


Output Total 800 ml


 


Balance 2805 ml

















JAKE ROWAN MD                Oct 15, 2020 08:59

## 2020-10-16 VITALS — DIASTOLIC BLOOD PRESSURE: 63 MMHG | SYSTOLIC BLOOD PRESSURE: 142 MMHG

## 2020-10-16 VITALS — DIASTOLIC BLOOD PRESSURE: 71 MMHG | SYSTOLIC BLOOD PRESSURE: 157 MMHG

## 2020-10-16 VITALS — SYSTOLIC BLOOD PRESSURE: 118 MMHG | DIASTOLIC BLOOD PRESSURE: 57 MMHG

## 2020-10-16 VITALS — DIASTOLIC BLOOD PRESSURE: 63 MMHG | SYSTOLIC BLOOD PRESSURE: 135 MMHG

## 2020-10-16 VITALS — DIASTOLIC BLOOD PRESSURE: 64 MMHG | SYSTOLIC BLOOD PRESSURE: 139 MMHG

## 2020-10-16 VITALS — DIASTOLIC BLOOD PRESSURE: 74 MMHG | SYSTOLIC BLOOD PRESSURE: 156 MMHG

## 2020-10-16 VITALS — SYSTOLIC BLOOD PRESSURE: 133 MMHG | DIASTOLIC BLOOD PRESSURE: 58 MMHG

## 2020-10-16 VITALS — SYSTOLIC BLOOD PRESSURE: 146 MMHG | DIASTOLIC BLOOD PRESSURE: 65 MMHG

## 2020-10-16 VITALS — SYSTOLIC BLOOD PRESSURE: 133 MMHG | DIASTOLIC BLOOD PRESSURE: 60 MMHG

## 2020-10-16 VITALS — SYSTOLIC BLOOD PRESSURE: 147 MMHG | DIASTOLIC BLOOD PRESSURE: 67 MMHG

## 2020-10-16 LAB
ALBUMIN SERPL BCG-MCNC: 1.5 GM/DL (ref 3.2–5.2)
ALT SERPL W P-5'-P-CCNC: 65 U/L (ref 12–78)
ANISOCYTOSIS BLD QL SMEAR: (no result)
BILIRUB SERPL-MCNC: 0.5 MG/DL (ref 0.2–1)
BUN SERPL-MCNC: 40 MG/DL (ref 7–18)
CALCIUM SERPL-MCNC: 7.5 MG/DL (ref 8.8–10.2)
CHLORIDE SERPL-SCNC: 113 MEQ/L (ref 98–107)
CO2 SERPL-SCNC: 25 MEQ/L (ref 21–32)
CREAT SERPL-MCNC: 0.97 MG/DL (ref 0.7–1.3)
EOSINOPHIL NFR BLD MANUAL: 4 % (ref 0–3)
GFR SERPL CREATININE-BSD FRML MDRD: > 60 ML/MIN/{1.73_M2} (ref 35–?)
GLUCOSE SERPL-MCNC: 180 MG/DL (ref 70–100)
HCT VFR BLD AUTO: 30.7 % (ref 42–52)
HGB BLD-MCNC: 10 G/DL (ref 13.5–17.5)
LYMPHOCYTES NFR BLD MANUAL: 5 % (ref 16–44)
MAGNESIUM SERPL-MCNC: 1.9 MG/DL (ref 1.8–2.4)
MCH RBC QN AUTO: 27.9 PG (ref 27–33)
MCHC RBC AUTO-ENTMCNC: 32.6 G/DL (ref 32–36.5)
MCV RBC AUTO: 85.8 FL (ref 80–96)
MONOCYTES NFR BLD MANUAL: 1 % (ref 0–5)
NEUTROPHILS NFR BLD MANUAL: 89 % (ref 28–66)
PLATELET # BLD AUTO: 125 10^3/UL (ref 150–450)
PLATELET BLD QL SMEAR: NORMAL
POLYCHROMASIA BLD QL SMEAR: (no result)
POTASSIUM SERPL-SCNC: 4.7 MEQ/L (ref 3.5–5.1)
PROT SERPL-MCNC: 4.5 GM/DL (ref 6.4–8.2)
RBC # BLD AUTO: 3.58 10^6/UL (ref 4.3–6.1)
SODIUM SERPL-SCNC: 142 MEQ/L (ref 136–145)
VANCOMYCIN SERPL-MCNC: 13.3 UG/ML
WBC # BLD AUTO: 12.3 10^3/UL (ref 4–10)

## 2020-10-16 PROCEDURE — 0Y6P0Z0 DETACHMENT AT RIGHT 1ST TOE, COMPLETE, OPEN APPROACH: ICD-10-PCS | Performed by: PODIATRIST

## 2020-10-16 RX ADMIN — INSULIN LISPRO SCH UNITS: 100 INJECTION, SOLUTION INTRAVENOUS; SUBCUTANEOUS at 07:30

## 2020-10-16 RX ADMIN — INSULIN DETEMIR SCH UNITS: 100 INJECTION, SOLUTION SUBCUTANEOUS at 09:00

## 2020-10-16 RX ADMIN — RIVAROXABAN SCH MG: 10 TABLET, FILM COATED ORAL at 20:36

## 2020-10-16 RX ADMIN — POTASSIUM CHLORIDE AND SODIUM CHLORIDE SCH MLS/HR: 900; 300 INJECTION, SOLUTION INTRAVENOUS at 17:29

## 2020-10-16 RX ADMIN — INSULIN LISPRO SCH UNITS: 100 INJECTION, SOLUTION INTRAVENOUS; SUBCUTANEOUS at 18:13

## 2020-10-16 RX ADMIN — CLOPIDOGREL BISULFATE SCH MG: 75 TABLET ORAL at 09:00

## 2020-10-16 RX ADMIN — PRAVASTATIN SODIUM SCH MG: 20 TABLET ORAL at 20:36

## 2020-10-16 RX ADMIN — PREGABALIN SCH MG: 100 CAPSULE ORAL at 20:36

## 2020-10-16 RX ADMIN — DEXTROSE MONOHYDRATE SCH MLS/HR: 5 INJECTION INTRAVENOUS at 22:28

## 2020-10-16 RX ADMIN — DEXTROSE MONOHYDRATE SCH MLS/HR: 5 INJECTION INTRAVENOUS at 05:52

## 2020-10-16 RX ADMIN — ACETAMINOPHEN PRN MG: 325 TABLET ORAL at 17:27

## 2020-10-16 RX ADMIN — DEXTROSE MONOHYDRATE SCH MLS/HR: 50 INJECTION, SOLUTION INTRAVENOUS at 07:06

## 2020-10-16 RX ADMIN — LEVOTHYROXINE SODIUM SCH MCG: 50 TABLET ORAL at 06:41

## 2020-10-16 RX ADMIN — INSULIN LISPRO SCH UNITS: 100 INJECTION, SOLUTION INTRAVENOUS; SUBCUTANEOUS at 20:38

## 2020-10-16 RX ADMIN — DEXTROSE MONOHYDRATE SCH MLS/HR: 5 INJECTION INTRAVENOUS at 12:31

## 2020-10-16 RX ADMIN — PREGABALIN SCH MG: 100 CAPSULE ORAL at 09:00

## 2020-10-16 RX ADMIN — TAMSULOSIN HYDROCHLORIDE SCH MG: 0.4 CAPSULE ORAL at 12:33

## 2020-10-16 RX ADMIN — POTASSIUM CHLORIDE AND SODIUM CHLORIDE SCH MLS/HR: 900; 300 INJECTION, SOLUTION INTRAVENOUS at 04:53

## 2020-10-16 RX ADMIN — INSULIN DETEMIR SCH UNITS: 100 INJECTION, SOLUTION SUBCUTANEOUS at 20:38

## 2020-10-16 RX ADMIN — DEXTROSE MONOHYDRATE SCH MLS/HR: 5 INJECTION INTRAVENOUS at 17:28

## 2020-10-16 RX ADMIN — PREGABALIN SCH MG: 100 CAPSULE ORAL at 17:28

## 2020-10-16 RX ADMIN — INSULIN LISPRO SCH UNITS: 100 INJECTION, SOLUTION INTRAVENOUS; SUBCUTANEOUS at 12:00

## 2020-10-16 NOTE — ECHO
DATE OF PROCEDURE: 10/15/2020 



Age: 88

Gender: Male  

Height: 175 cm  

Weight: 67 kg  



REFERRING PHYSICIAN: Susie Marcum M.D.  



INDICATION: Sepsis. 



MEASUREMENTS: 

2D Measurements:   

      Intraventricular septum 0.81 cm

    Posterior wall 1.11 cm

      Left ventricle diastole 5.4 cm 

      Aortic root 3.4 cm

      Left atrium 4.0 cm

      Inferior vena cava 1.9 cm 

      

Doppler Measurements:

      No aortic stenosis 

      No aortic regurgitation 

      Aortic valve velocity 90.5 cm/s

      LVOT velocity 65.9 cm/s

      No mitral regurgitation 

      No mitral stenosis 

      No tricuspid regurgitation 

      No pulmonic regurgitation



MITRAL ANNULAR TISSUE DOPPLER 

     E prime septal 4.4 cm/s, E prime lateral 8.4 cm/s 

 

DESCRIPTION: Rhythm was atrial fibrillation with controlled ventricular rate. 
This was a moderately technically difficult echocardiogram. No pericardial 
effusion. This was a 2D, M-mode, color flow Doppler, and pulsed wave Doppler 
examination including mitral annular tissue Doppler. 



CONCLUSIONS:

1.  No vegetations identified. 

2.  Moderately technically difficult echocardiogram. 

3.  Normal left ventricle internal dimensions and wall thickness. Normal 
regional left ventricular (LV) wall motion and wall thickening. Normal left 
ventricular (LV) systolic function. Left ventricular ejection fraction (LVEF) 
60% by visual estimate. Unable to determine left ventricular (LV) diastolic 
function in the setting of atrial fibrillation. 

4.  Mild mitral annular calcification. No mitral regurgitation.

5.  Mild aortic valve sclerosis of a 3-cuspid aortic valve. No aortic 
regurgitation. 

MTDD

## 2020-10-16 NOTE — IPNPDOC
Text Note


Date of Service


The patient was seen on 10/16/20.





NOTE


Subjective: Patient was seen and examined at the bedside. Currently patient 

reports that he feels fine. Denies any nausea, vomiting, chest pain, shortness 

breath, palpitations, abdominal pain, diarrhea, or urinary discomfort. right toe

foul smell. 





Objective:


Vitals (See below)


General: Lying in bed, comfortable, AAOx3


HEENT: NC, AT, moist mucous membranes, anicteric eyes.


CVS: +S1S2, Regular, normal rate, No Rub/ mumur or gallop


Lungs: Fair air entry b/l, no appreciable wheezing, rhonchi or rales


Abdomen: Soft, nondistended, nontender, bowel sounds normal. 


Extremities: No evidence of edema, - Calf tenderness, right first digit dry 

gangrene surrounding erythema, warmth and odor 





Labs and radiology reviewed





Assessment and plan: Patient is an 88-year-old  male with a PMHx of 

HTN, NIDDM2, PVD (s/p RLE femoral-popliteal bypass), Hx of Chronic RLE digit ne

crosis, DLP, Hypothyroidism, Neuropathy, BPH, GERD who presented to the hospital

after experiencing 3-4 days of aches and pains all over his body. Patient was 

admitted to the hospital service for sepsis secondary to his right lower 

extremities cellulitis/osteomyelitis. Patient has had first digit necrosis / 

gangrene.





Sepsis 


due to Cellulitis/osteomyelitis and Gangrene of R foot 1st digit 


Has PAD.


Patient and daughters had refused transfer to [facility] for further 

intervention (recent intervention 2 weeks ago)


Blood cultures 10/14, 1/2 streptococcus agalactiae


Wound cultures 10/14: MSSA and Streptococcus agalactiae


c/w Normal saline and broad-spectrum antibiotics with vancomycin and Zosyn (Day 

#3)


Podiatry, Dr. Plaza on consultation; will be taken to OR today for 

amputation of RLE first digit and possible metatarsal


PT/OT after surgery. 





s/p A. fib with RVR


Upon presentation, patient had an elevated heart rate in the emergency room


Troponin trend 3, has remained negative


EKG consistent with atrial fibrillation with RVR, heart rate of 105


ECHO complete; report pending 


c/w Lovenox, renally adjusted therapeutic dosing 





s/p Lactic acidosis





Hypokalemia


replaced





HTN


was hypotensive on presentation


hold home blood pressure medications; Carvedilol and amlodipine


c/w IV fluid 





NIDDM2 with Hyperglycemia 


ISS, lispro


Levemir





PVD  s/p RLE femoral-popliteal bypass


Plavix; Will hold Xarelto 2.5 mg daily 


Lovenox 





DLP


Pravastatin 





Hypothyroidism


Levothyroxine 





Neuropathy


Lyrica





BPH


Tamsulosin 





GI prophylaxis / GERD


Protonix





DVT prophylaxis 


Lovenox therapeutic (re: CARRIE keys), will restart xarelto after surgery





Code status:


DNR / DNI





Disposition:


Daughter/healthcare proxy Kate Yanez [phone]





VS,Jeffery, I+O


VS, Ivonnee, I+O


Laboratory Tests


10/15/20 13:47








10/16/20 04:59











Vital Signs








  Date Time  Temp Pulse Resp B/P (MAP) Pulse Ox O2 Delivery O2 Flow Rate FiO2


 


10/16/20 08:00 97.2 71 20 133/60 (84) 98 Room Air  














I&O- Last 24 Hours up to 6 AM 


 


 10/16/20





 06:00


 


Intake Total 3205 ml


 


Output Total 1450 ml


 


Balance 1755 ml

















KADEEM MCCORMACK MD                   Oct 16, 2020 11:19

## 2020-10-17 VITALS — DIASTOLIC BLOOD PRESSURE: 64 MMHG | SYSTOLIC BLOOD PRESSURE: 121 MMHG

## 2020-10-17 VITALS — SYSTOLIC BLOOD PRESSURE: 121 MMHG | DIASTOLIC BLOOD PRESSURE: 61 MMHG

## 2020-10-17 VITALS — SYSTOLIC BLOOD PRESSURE: 119 MMHG | DIASTOLIC BLOOD PRESSURE: 61 MMHG

## 2020-10-17 VITALS — DIASTOLIC BLOOD PRESSURE: 52 MMHG | SYSTOLIC BLOOD PRESSURE: 140 MMHG

## 2020-10-17 LAB
ALBUMIN SERPL BCG-MCNC: 1.5 GM/DL (ref 3.2–5.2)
ALT SERPL W P-5'-P-CCNC: 50 U/L (ref 12–78)
BASOPHILS # BLD AUTO: 0 10^3/UL (ref 0–0.2)
BASOPHILS NFR BLD AUTO: 0.1 % (ref 0–1)
BILIRUB SERPL-MCNC: 0.5 MG/DL (ref 0.2–1)
BUN SERPL-MCNC: 24 MG/DL (ref 7–18)
CALCIUM SERPL-MCNC: 7.7 MG/DL (ref 8.8–10.2)
CHLORIDE SERPL-SCNC: 110 MEQ/L (ref 98–107)
CO2 SERPL-SCNC: 29 MEQ/L (ref 21–32)
CREAT SERPL-MCNC: 0.83 MG/DL (ref 0.7–1.3)
EOSINOPHIL # BLD AUTO: 0.5 10^3/UL (ref 0–0.5)
EOSINOPHIL NFR BLD AUTO: 3.6 % (ref 0–3)
GFR SERPL CREATININE-BSD FRML MDRD: > 60 ML/MIN/{1.73_M2} (ref 35–?)
GLUCOSE SERPL-MCNC: 151 MG/DL (ref 70–100)
HCT VFR BLD AUTO: 32 % (ref 42–52)
HGB BLD-MCNC: 10.2 G/DL (ref 13.5–17.5)
LYMPHOCYTES # BLD AUTO: 1.1 10^3/UL (ref 1.5–5)
LYMPHOCYTES NFR BLD AUTO: 7.5 % (ref 24–44)
MAGNESIUM SERPL-MCNC: 2 MG/DL (ref 1.8–2.4)
MCH RBC QN AUTO: 27.3 PG (ref 27–33)
MCHC RBC AUTO-ENTMCNC: 31.9 G/DL (ref 32–36.5)
MCV RBC AUTO: 85.6 FL (ref 80–96)
MONOCYTES # BLD AUTO: 1 10^3/UL (ref 0–0.8)
MONOCYTES NFR BLD AUTO: 6.6 % (ref 0–5)
NEUTROPHILS # BLD AUTO: 11.5 10^3/UL (ref 1.5–8.5)
NEUTROPHILS NFR BLD AUTO: 79.9 % (ref 36–66)
PLATELET # BLD AUTO: 139 10^3/UL (ref 150–450)
POTASSIUM SERPL-SCNC: 4.3 MEQ/L (ref 3.5–5.1)
PROT SERPL-MCNC: 4.4 GM/DL (ref 6.4–8.2)
RBC # BLD AUTO: 3.74 10^6/UL (ref 4.3–6.1)
SODIUM SERPL-SCNC: 144 MEQ/L (ref 136–145)
WBC # BLD AUTO: 14.4 10^3/UL (ref 4–10)

## 2020-10-17 RX ADMIN — INSULIN LISPRO SCH UNITS: 100 INJECTION, SOLUTION INTRAVENOUS; SUBCUTANEOUS at 17:50

## 2020-10-17 RX ADMIN — INSULIN LISPRO SCH UNITS: 100 INJECTION, SOLUTION INTRAVENOUS; SUBCUTANEOUS at 10:11

## 2020-10-17 RX ADMIN — FUROSEMIDE SCH MG: 40 TABLET ORAL at 10:12

## 2020-10-17 RX ADMIN — RIVAROXABAN SCH MG: 10 TABLET, FILM COATED ORAL at 20:20

## 2020-10-17 RX ADMIN — DEXTROSE MONOHYDRATE SCH MLS/HR: 5 INJECTION INTRAVENOUS at 17:50

## 2020-10-17 RX ADMIN — INSULIN DETEMIR SCH UNITS: 100 INJECTION, SOLUTION SUBCUTANEOUS at 10:10

## 2020-10-17 RX ADMIN — DEXTROSE MONOHYDRATE SCH MLS/HR: 5 INJECTION INTRAVENOUS at 10:09

## 2020-10-17 RX ADMIN — INSULIN LISPRO SCH UNITS: 100 INJECTION, SOLUTION INTRAVENOUS; SUBCUTANEOUS at 13:32

## 2020-10-17 RX ADMIN — ACETAMINOPHEN PRN MG: 325 TABLET ORAL at 10:12

## 2020-10-17 RX ADMIN — PREGABALIN SCH MG: 100 CAPSULE ORAL at 17:50

## 2020-10-17 RX ADMIN — LEVOTHYROXINE SODIUM SCH MCG: 50 TABLET ORAL at 06:28

## 2020-10-17 RX ADMIN — TAMSULOSIN HYDROCHLORIDE SCH MG: 0.4 CAPSULE ORAL at 10:16

## 2020-10-17 RX ADMIN — PRAVASTATIN SODIUM SCH MG: 20 TABLET ORAL at 20:20

## 2020-10-17 RX ADMIN — RIVAROXABAN SCH MG: 10 TABLET, FILM COATED ORAL at 10:15

## 2020-10-17 RX ADMIN — KETOROLAC TROMETHAMINE PRN MG: 30 INJECTION, SOLUTION INTRAMUSCULAR at 11:57

## 2020-10-17 RX ADMIN — CLOPIDOGREL BISULFATE SCH MG: 75 TABLET ORAL at 10:12

## 2020-10-17 RX ADMIN — PREGABALIN SCH MG: 100 CAPSULE ORAL at 20:21

## 2020-10-17 RX ADMIN — INSULIN DETEMIR SCH UNITS: 100 INJECTION, SOLUTION SUBCUTANEOUS at 20:22

## 2020-10-17 RX ADMIN — DEXTROSE MONOHYDRATE SCH MLS/HR: 5 INJECTION INTRAVENOUS at 23:19

## 2020-10-17 RX ADMIN — DEXTROSE MONOHYDRATE SCH MLS/HR: 5 INJECTION INTRAVENOUS at 06:28

## 2020-10-17 RX ADMIN — ACETAMINOPHEN SCH MG: 500 TABLET ORAL at 20:23

## 2020-10-17 RX ADMIN — PREGABALIN SCH MG: 100 CAPSULE ORAL at 10:12

## 2020-10-17 RX ADMIN — INSULIN LISPRO SCH UNITS: 100 INJECTION, SOLUTION INTRAVENOUS; SUBCUTANEOUS at 20:13

## 2020-10-17 NOTE — IPNPDOC
Text Note


Date of Service


The patient was seen on 10/17/20.





NOTE


Subjective: Patient was seen and examined at the bedside. He complains of back 

pain and is very weak. Says unable to roll in bed even. Urinary incontinence. 

Noted to have 2 pressure ulcers on back and 1 blister.  





Objective:


Vitals (See below)


General: Lying in bed,  AAOx3


HEENT: NC, AT, moist mucous membranes, anicteric eyes.


CVS: +S1S2 Regular, normal rate, No Rub/ murmur or gallop


Lungs: Fair air entry b/l, no appreciable wheezing, rhonchi or rales


Abdomen: Soft, nondistended, nontender, bowel sounds normal. 


Extremities: No evidence of edema, - Calf tenderness, right first digit dry 

gangrene surrounding erythema, warmth and odor 


Skin: sacrum/ coccyx stage 2 pressure ulcer with deep tissue injury in part of 

it 8 cm x 5 cm, stage 2 ulcer on the left buttock laterally with granulation 

tissues 5 cm x 4 cm was ablister before which has now broken,  and a small fluid

filled blister on the left buttock above the stage 2 pressure injury.  





Labs and radiology reviewed





Assessment and plan: Patient is an 88-year-old  male with a PMHx of 

HTN, NIDDM2, PVD (s/p RLE femoral-popliteal bypass), Hx of Chronic RLE digit 

necrosis, DLP, Hypothyroidism, Neuropathy, BPH, GERD who presented to the 

hospital after experiencing 3-4 days of aches and pains all over his body. 

Patient was admitted to the hospital service for sepsis secondary to his right 

lower extremities cellulitis/osteomyelitis. Patient has had first digit necrosis

/ gangrene.





Sepsis 


due to Cellulitis/osteomyelitis and Gangrene of R foot 1st digit 


Has PAD.


Patient and daughters had refused transfer to [facility] for further 

intervention (recent intervention 2 weeks ago)


Blood cultures 10/14, 1/2 streptococcus agalactiae


Wound cultures 10/14: MSSA and Streptococcus agalactiae


Zosyn 


s/p  amputation of RLE first digit on 10/16


PT/OT 





Sacral/ cocyx pressure ulcer present on admission


Blisters and new pressure ulcer onthe left buttock which was a blister before. 


frequent repositioning. 


optifoam dressing. 


Wound consult on Monday. 





s/p A. fib with RVR


Now rate controlled. 


ECHO complete; report pending 


restarted xarelto. 





s/p Lactic acidosis





Hypokalemia


replaced





HTN


was hypotensive on presentation, now normal 


coreg restarted. Lasix started


hold  amlodipine





NIDDM2 with Hyperglycemia 


ISS, lispro


Levemir





PVD  s/p RLE femoral-popliteal bypass


Plavix , xarelto





DLP


Pravastatin 





Hypothyroidism


Levothyroxine 





Back pain/ Neuropathy


Lyrica, tylenol, toradol. 





BPH


Tamsulosin 





GI prophylaxis / GERD


Protonix





DVT prophylaxis : xarelto





Code status:


DNR / DNI





Disposition: PT evaluation


Daughter/healthcare proxy Kate Yanez [phone]





VS,Fishbone, I+O


VS, Fishbone, I+O


Laboratory Tests


10/17/20 06:58











Vital Signs








  Date Time  Temp Pulse Resp B/P (MAP) Pulse Ox O2 Delivery O2 Flow Rate FiO2


 


10/17/20 10:15  75  121/61    


 


10/17/20 06:00 98.4  18  94 Room Air  














I&O- Last 24 Hours up to 6 AM0 


 


 10/17/20





 06:00


 


Intake Total 1350 ml


 


Output Total 1575 ml


 


Balance -225 ml

















KADEEM MCCORMACK MD                   Oct 17, 2020 11:30

## 2020-10-18 VITALS — SYSTOLIC BLOOD PRESSURE: 130 MMHG | DIASTOLIC BLOOD PRESSURE: 50 MMHG

## 2020-10-18 VITALS — SYSTOLIC BLOOD PRESSURE: 147 MMHG | DIASTOLIC BLOOD PRESSURE: 67 MMHG

## 2020-10-18 VITALS — SYSTOLIC BLOOD PRESSURE: 128 MMHG | DIASTOLIC BLOOD PRESSURE: 49 MMHG

## 2020-10-18 LAB
ALBUMIN SERPL BCG-MCNC: 1.5 GM/DL (ref 3.2–5.2)
ALT SERPL W P-5'-P-CCNC: 67 U/L (ref 12–78)
BASOPHILS # BLD AUTO: 0 10^3/UL (ref 0–0.2)
BASOPHILS NFR BLD AUTO: 0.2 % (ref 0–1)
BILIRUB SERPL-MCNC: 0.4 MG/DL (ref 0.2–1)
BUN SERPL-MCNC: 25 MG/DL (ref 7–18)
CALCIUM SERPL-MCNC: 7.4 MG/DL (ref 8.8–10.2)
CHLORIDE SERPL-SCNC: 104 MEQ/L (ref 98–107)
CO2 SERPL-SCNC: 29 MEQ/L (ref 21–32)
CREAT SERPL-MCNC: 1.02 MG/DL (ref 0.7–1.3)
EOSINOPHIL # BLD AUTO: 0.6 10^3/UL (ref 0–0.5)
EOSINOPHIL NFR BLD AUTO: 4.2 % (ref 0–3)
GFR SERPL CREATININE-BSD FRML MDRD: > 60 ML/MIN/{1.73_M2} (ref 35–?)
GLUCOSE SERPL-MCNC: 236 MG/DL (ref 70–100)
HCT VFR BLD AUTO: 30.7 % (ref 42–52)
HGB BLD-MCNC: 9.8 G/DL (ref 13.5–17.5)
LYMPHOCYTES # BLD AUTO: 0.9 10^3/UL (ref 1.5–5)
LYMPHOCYTES NFR BLD AUTO: 7 % (ref 24–44)
MCH RBC QN AUTO: 27.2 PG (ref 27–33)
MCHC RBC AUTO-ENTMCNC: 31.9 G/DL (ref 32–36.5)
MCV RBC AUTO: 85.3 FL (ref 80–96)
MONOCYTES # BLD AUTO: 0.7 10^3/UL (ref 0–0.8)
MONOCYTES NFR BLD AUTO: 5.7 % (ref 0–5)
NEUTROPHILS # BLD AUTO: 10.6 10^3/UL (ref 1.5–8.5)
NEUTROPHILS NFR BLD AUTO: 81.2 % (ref 36–66)
PLATELET # BLD AUTO: 138 10^3/UL (ref 150–450)
POTASSIUM SERPL-SCNC: 4.2 MEQ/L (ref 3.5–5.1)
PROT SERPL-MCNC: 4.3 GM/DL (ref 6.4–8.2)
RBC # BLD AUTO: 3.6 10^6/UL (ref 4.3–6.1)
SODIUM SERPL-SCNC: 139 MEQ/L (ref 136–145)
WBC # BLD AUTO: 13.1 10^3/UL (ref 4–10)

## 2020-10-18 RX ADMIN — INSULIN DETEMIR SCH UNITS: 100 INJECTION, SOLUTION SUBCUTANEOUS at 08:25

## 2020-10-18 RX ADMIN — DEXTROSE MONOHYDRATE SCH MLS/HR: 5 INJECTION INTRAVENOUS at 17:14

## 2020-10-18 RX ADMIN — INSULIN DETEMIR SCH UNITS: 100 INJECTION, SOLUTION SUBCUTANEOUS at 22:13

## 2020-10-18 RX ADMIN — LEVOTHYROXINE SODIUM SCH MCG: 50 TABLET ORAL at 06:04

## 2020-10-18 RX ADMIN — DEXTROSE MONOHYDRATE SCH MLS/HR: 5 INJECTION INTRAVENOUS at 11:04

## 2020-10-18 RX ADMIN — INSULIN LISPRO SCH UNITS: 100 INJECTION, SOLUTION INTRAVENOUS; SUBCUTANEOUS at 21:00

## 2020-10-18 RX ADMIN — DEXTROSE MONOHYDRATE SCH MLS/HR: 5 INJECTION INTRAVENOUS at 22:13

## 2020-10-18 RX ADMIN — DEXTROSE MONOHYDRATE SCH MLS/HR: 5 INJECTION INTRAVENOUS at 05:56

## 2020-10-18 RX ADMIN — PREGABALIN SCH MG: 100 CAPSULE ORAL at 17:15

## 2020-10-18 RX ADMIN — FUROSEMIDE SCH MG: 40 TABLET ORAL at 08:24

## 2020-10-18 RX ADMIN — KETOROLAC TROMETHAMINE PRN MG: 30 INJECTION, SOLUTION INTRAMUSCULAR at 11:06

## 2020-10-18 RX ADMIN — ACETAMINOPHEN SCH MG: 500 TABLET ORAL at 22:12

## 2020-10-18 RX ADMIN — RIVAROXABAN SCH MG: 10 TABLET, FILM COATED ORAL at 22:11

## 2020-10-18 RX ADMIN — ACETAMINOPHEN SCH MG: 500 TABLET ORAL at 08:23

## 2020-10-18 RX ADMIN — RIVAROXABAN SCH MG: 10 TABLET, FILM COATED ORAL at 08:24

## 2020-10-18 RX ADMIN — PREGABALIN SCH MG: 100 CAPSULE ORAL at 22:11

## 2020-10-18 RX ADMIN — PREGABALIN SCH MG: 100 CAPSULE ORAL at 08:23

## 2020-10-18 RX ADMIN — INSULIN LISPRO SCH UNITS: 100 INJECTION, SOLUTION INTRAVENOUS; SUBCUTANEOUS at 08:25

## 2020-10-18 RX ADMIN — TAMSULOSIN HYDROCHLORIDE SCH MG: 0.4 CAPSULE ORAL at 08:23

## 2020-10-18 RX ADMIN — INSULIN LISPRO SCH UNITS: 100 INJECTION, SOLUTION INTRAVENOUS; SUBCUTANEOUS at 13:08

## 2020-10-18 RX ADMIN — INSULIN LISPRO SCH UNITS: 100 INJECTION, SOLUTION INTRAVENOUS; SUBCUTANEOUS at 17:15

## 2020-10-18 RX ADMIN — CLOPIDOGREL BISULFATE SCH MG: 75 TABLET ORAL at 08:23

## 2020-10-18 RX ADMIN — PRAVASTATIN SODIUM SCH MG: 20 TABLET ORAL at 22:12

## 2020-10-18 NOTE — IPNPDOC
Text Note


Date of Service


The patient was seen on 10/18/20.





NOTE


Subjective: Patient was seen and examined at the bedside. He complains of back 

pain and is very weak. Says unable to roll in bed even. Urinary incontinence. 

Noted to have 2 pressure ulcers on back and 1 blister.  





Objective:


Vitals (See below)


General: Lying in bed,  AAOx3


HEENT: NC, AT, moist mucous membranes, anicteric eyes.


CVS: +S1S2 Regular, normal rate, No Rub/ murmur or gallop


Lungs: Fair air entry b/l, no appreciable wheezing, rhonchi or rales


Abdomen: Soft, nondistended, nontender, bowel sounds normal. 


Extremities: No evidence of edema, - Calf tenderness, right first digit dry 

gangrene surrounding erythema, warmth and odor 


Skin: sacrum/ coccyx stage 2 pressure ulcer with deep tissue injury in part of 

it 8 cm x 5 cm, stage 2 ulcer on the left buttock laterally with granulation 

tissues 5 cm x 4 cm was ablister before which has now broken,  and a small fluid

filled blister on the left buttock above the stage 2 pressure injury.  





Labs and radiology reviewed





Assessment and plan: Patient is an 88-year-old  male with a PMHx of 

HTN, NIDDM2, PVD (s/p RLE femoral-popliteal bypass), Hx of Chronic RLE digit 

necrosis, DLP, Hypothyroidism, Neuropathy, BPH, GERD who presented to the 

hospital after experiencing 3-4 days of aches and pains all over his body. 

Patient was admitted to the hospital service for sepsis secondary to his right 

lower extremities cellulitis/osteomyelitis. Patient has had first digit necrosis

/ gangrene.





Sepsis 


due to Cellulitis/osteomyelitis and Gangrene of R foot 1st digit 


Has PAD.


Patient and daughters had refused transfer to [facility] for further 

intervention (recent intervention 2 weeks ago)


Blood cultures 10/14, 1/2 streptococcus agalactiae


Wound cultures 10/14: MSSA and Streptococcus agalactiae


Zosyn 


s/p  amputation of RLE first digit on 10/16


PT/OT 





Sacral/ cocyx pressure ulcer present on admission


Blisters and new pressure ulcer onthe left buttock which was a blister before. 


frequent repositioning. 


optifoam dressing. 


Wound consult on Monday. 





s/p A. fib with RVR


Now rate controlled. 


ECHO complete; report pending 


restarted xarelto. 





s/p Lactic acidosis





Hypokalemia


replaced





HTN


was hypotensive on presentation, now normal 


coreg restarted. Lasix started


hold  amlodipine





NIDDM2 with Hyperglycemia 


ISS, lispro


Levemir





PVD  s/p RLE femoral-popliteal bypass


Plavix , xarelto





DLP


Pravastatin 





Hypothyroidism


Levothyroxine 





Back pain/ Neuropathy


Lyrica, tylenol, toradol. 





BPH


Tamsulosin 





GI prophylaxis / GERD


Protonix





DVT prophylaxis : xarelto





Code status:


DNR / DNI





Disposition: PT evaluation


Daughter/healthcare proxy Kate Yanez [phone]





VS,Fishbone, I+O


VS, Fishbone, I+O


Laboratory Tests


10/18/20 06:41











Vital Signs








  Date Time  Temp Pulse Resp B/P (MAP) Pulse Ox O2 Delivery O2 Flow Rate FiO2


 


10/18/20 08:24  73  147/67    


 


10/18/20 06:00 97.8  18  97 Room Air  














I&O- Last 24 Hours up to 6 AM0 


 


 10/18/20





 06:00


 


Intake Total 1100 ml


 


Output Total 1650 ml


 


Balance -550 ml

















KADEEM MCCORMACK MD                   Oct 18, 2020 11:08

## 2020-10-19 VITALS — DIASTOLIC BLOOD PRESSURE: 50 MMHG | SYSTOLIC BLOOD PRESSURE: 121 MMHG

## 2020-10-19 VITALS — SYSTOLIC BLOOD PRESSURE: 146 MMHG | DIASTOLIC BLOOD PRESSURE: 62 MMHG

## 2020-10-19 VITALS — SYSTOLIC BLOOD PRESSURE: 114 MMHG | DIASTOLIC BLOOD PRESSURE: 50 MMHG

## 2020-10-19 LAB
ALBUMIN SERPL BCG-MCNC: 1.4 GM/DL (ref 3.2–5.2)
ALT SERPL W P-5'-P-CCNC: 51 U/L (ref 12–78)
BASOPHILS # BLD AUTO: 0 10^3/UL (ref 0–0.2)
BASOPHILS NFR BLD AUTO: 0.2 % (ref 0–1)
BILIRUB SERPL-MCNC: 0.4 MG/DL (ref 0.2–1)
BUN SERPL-MCNC: 23 MG/DL (ref 7–18)
CALCIUM SERPL-MCNC: 7.2 MG/DL (ref 8.8–10.2)
CHLORIDE SERPL-SCNC: 103 MEQ/L (ref 98–107)
CO2 SERPL-SCNC: 31 MEQ/L (ref 21–32)
CREAT SERPL-MCNC: 1.03 MG/DL (ref 0.7–1.3)
EOSINOPHIL # BLD AUTO: 0.6 10^3/UL (ref 0–0.5)
EOSINOPHIL NFR BLD AUTO: 4.4 % (ref 0–3)
GFR SERPL CREATININE-BSD FRML MDRD: > 60 ML/MIN/{1.73_M2} (ref 35–?)
GLUCOSE SERPL-MCNC: 179 MG/DL (ref 70–100)
HCT VFR BLD AUTO: 28.4 % (ref 42–52)
HGB BLD-MCNC: 9.1 G/DL (ref 13.5–17.5)
LYMPHOCYTES # BLD AUTO: 1 10^3/UL (ref 1.5–5)
LYMPHOCYTES NFR BLD AUTO: 7.8 % (ref 24–44)
MCH RBC QN AUTO: 27 PG (ref 27–33)
MCHC RBC AUTO-ENTMCNC: 32 G/DL (ref 32–36.5)
MCV RBC AUTO: 84.3 FL (ref 80–96)
MONOCYTES # BLD AUTO: 0.8 10^3/UL (ref 0–0.8)
MONOCYTES NFR BLD AUTO: 6.1 % (ref 0–5)
NEUTROPHILS # BLD AUTO: 10.4 10^3/UL (ref 1.5–8.5)
NEUTROPHILS NFR BLD AUTO: 79.6 % (ref 36–66)
PLATELET # BLD AUTO: 193 10^3/UL (ref 150–450)
POTASSIUM SERPL-SCNC: 4.1 MEQ/L (ref 3.5–5.1)
PROT SERPL-MCNC: 4.4 GM/DL (ref 6.4–8.2)
RBC # BLD AUTO: 3.37 10^6/UL (ref 4.3–6.1)
SODIUM SERPL-SCNC: 140 MEQ/L (ref 136–145)
WBC # BLD AUTO: 13 10^3/UL (ref 4–10)

## 2020-10-19 RX ADMIN — PREGABALIN SCH MG: 100 CAPSULE ORAL at 08:08

## 2020-10-19 RX ADMIN — DEXTROSE MONOHYDRATE SCH MLS/HR: 5 INJECTION INTRAVENOUS at 05:52

## 2020-10-19 RX ADMIN — FUROSEMIDE SCH MG: 40 TABLET ORAL at 08:09

## 2020-10-19 RX ADMIN — CLOPIDOGREL BISULFATE SCH MG: 75 TABLET ORAL at 08:08

## 2020-10-19 RX ADMIN — ACETAMINOPHEN PRN MG: 500 TABLET ORAL at 15:34

## 2020-10-19 RX ADMIN — INSULIN LISPRO SCH UNITS: 100 INJECTION, SOLUTION INTRAVENOUS; SUBCUTANEOUS at 12:08

## 2020-10-19 RX ADMIN — RIVAROXABAN SCH MG: 10 TABLET, FILM COATED ORAL at 20:37

## 2020-10-19 RX ADMIN — DEXTROSE MONOHYDRATE SCH MLS/HR: 5 INJECTION INTRAVENOUS at 17:20

## 2020-10-19 RX ADMIN — PREGABALIN SCH MG: 100 CAPSULE ORAL at 15:34

## 2020-10-19 RX ADMIN — INSULIN DETEMIR SCH UNITS: 100 INJECTION, SOLUTION SUBCUTANEOUS at 20:39

## 2020-10-19 RX ADMIN — PRAVASTATIN SODIUM SCH MG: 20 TABLET ORAL at 20:37

## 2020-10-19 RX ADMIN — INSULIN LISPRO SCH UNITS: 100 INJECTION, SOLUTION INTRAVENOUS; SUBCUTANEOUS at 07:48

## 2020-10-19 RX ADMIN — TAMSULOSIN HYDROCHLORIDE SCH MG: 0.4 CAPSULE ORAL at 08:08

## 2020-10-19 RX ADMIN — PREGABALIN SCH MG: 100 CAPSULE ORAL at 20:37

## 2020-10-19 RX ADMIN — DEXTROSE MONOHYDRATE SCH MLS/HR: 5 INJECTION INTRAVENOUS at 11:01

## 2020-10-19 RX ADMIN — HYDROCODONE BITARTRATE AND ACETAMINOPHEN SCH TAB: 5; 325 TABLET ORAL at 08:38

## 2020-10-19 RX ADMIN — INSULIN DETEMIR SCH UNITS: 100 INJECTION, SOLUTION SUBCUTANEOUS at 08:10

## 2020-10-19 RX ADMIN — LEVOTHYROXINE SODIUM SCH MCG: 50 TABLET ORAL at 05:52

## 2020-10-19 RX ADMIN — DEXTROSE MONOHYDRATE SCH MLS/HR: 5 INJECTION INTRAVENOUS at 23:43

## 2020-10-19 RX ADMIN — INSULIN LISPRO SCH UNITS: 100 INJECTION, SOLUTION INTRAVENOUS; SUBCUTANEOUS at 20:39

## 2020-10-19 RX ADMIN — KETOROLAC TROMETHAMINE PRN MG: 30 INJECTION, SOLUTION INTRAMUSCULAR at 17:20

## 2020-10-19 RX ADMIN — RIVAROXABAN SCH MG: 10 TABLET, FILM COATED ORAL at 08:09

## 2020-10-19 RX ADMIN — INSULIN LISPRO SCH UNITS: 100 INJECTION, SOLUTION INTRAVENOUS; SUBCUTANEOUS at 18:17

## 2020-10-19 RX ADMIN — KETOROLAC TROMETHAMINE PRN MG: 30 INJECTION, SOLUTION INTRAMUSCULAR at 05:52

## 2020-10-19 RX ADMIN — HYDROCODONE BITARTRATE AND ACETAMINOPHEN SCH TAB: 5; 325 TABLET ORAL at 20:38

## 2020-10-19 NOTE — IPNPDOC
Text Note


Date of Service


The patient was seen on 10/19/20.





NOTE


Subjective:  Patient had refused to work with PT yesterday. However today he 

appears more motivated to work with PT. Does not complain of any significant 

back pain today. 





Objective:


Vitals (See below)


General: Lying in bed,  AAOx3


HEENT: NC, AT, moist mucous membranes, anicteric eyes.


CVS: +S1S2 Regular, normal rate, No Rub/ murmur or gallop


Lungs: Fair air entry b/l, no appreciable wheezing, rhonchi or rales


Abdomen: Soft, nondistended, nontender, bowel sounds normal. 


Extremities: No evidence of edema, - Calf tenderness, right first digit dry 

gangrene surrounding erythema, warmth and odor 


Skin: sacrum/ coccyx stage 2 pressure ulcer with deep tissue injury in part of 

it 8 cm x 5 cm, stage 2 ulcer on the left buttock laterally with granulation 

tissues 5 cm x 4 cm was ablister before which has now broken,  and a small fluid

filled blister on the left buttock above the stage 2 pressure injury.  





Labs and radiology reviewed





Assessment and plan: Patient is an 88-year-old  male with a PMHx of 

HTN, NIDDM2, PVD (s/p RLE femoral-popliteal bypass), Hx of Chronic RLE digit 

necrosis, DLP, Hypothyroidism, Neuropathy, BPH, GERD who presented to the 

hospital after experiencing 3-4 days of aches and pains all over his body. 

Patient was admitted to the hospital service for sepsis secondary to his right 

lower extremities cellulitis/osteomyelitis. Patient has had first digit necrosis

/ gangrene.





Sepsis 


due to Cellulitis/osteomyelitis and Gangrene of R foot 1st digit 


Has PAD.


Patient and daughters had refused transfer to [facility] for further int

ervention (recent intervention 2 weeks ago)


Blood cultures 10/14, 1/2 streptococcus agalactiae


Wound cultures 10/14: MSSA and Streptococcus agalactiae


Zosyn.


s/p  amputation of RLE first digit on 10/16


PT/OT 





Sacral/ cocyx pressure ulcer present on admission


Blisters and new pressure ulcer onthe left buttock which was a blister before. 


frequent repositioning. 


optifoam dressing. 


Wound consult on Monday. 





s/p A. fib with RVR


Now rate controlled. 


ECHO complete; report pending 


restarted xarelto. 





s/p Lactic acidosis





Hypokalemia


replaced





HTN


was hypotensive on presentation, now normal 


coreg restarted. Lasix started


hold  amlodipine





NIDDM2 with Hyperglycemia 


ISS, lispro


Levemir





PVD  s/p RLE femoral-popliteal bypass


Plavix , xarelto





DLP


Pravastatin 





Hypothyroidism


Levothyroxine 





Back pain/ Neuropathy


Lyrica, tylenol, toradol. norco. 





BPH


Tamsulosin 





GI prophylaxis / GERD


Protonix





DVT prophylaxis : xarelto





Code status:


DNR / DNI





Disposition: PT evaluation. i suspect he will need continued rehab on discharge.







Daughter/healthcare proxy Kate Yanez [phone]





VS,Jeffery, I+O


VS, Jeffery, I+O


Laboratory Tests


10/19/20 05:25











Vital Signs








  Date Time  Temp Pulse Resp B/P (MAP) Pulse Ox O2 Delivery O2 Flow Rate FiO2


 


10/19/20 06:00 97.8 70 18 146/62 (90) 96 Room Air  














I&O- Last 24 Hours up to 6 AM 


 


 10/19/20





 07:00


 


Intake Total 2040 ml


 


Output Total 2500 ml


 


Balance -460 ml

















KADEEM MCCORMACK MD                   Oct 19, 2020 07:52

## 2020-10-19 NOTE — RO
DATE OF OPERATION:  10/16/2020



PREOPERATIVE DIAGNOSIS:  

Necrotic right hallux.



POSTOPERATIVE DIAGNOSIS:  

Necrotic right hallux.



PROCEDURE: Amputation, right hallux, at the metatarsophalangeal joint, right 
foot.



HEMOSTASIS: None utilized.



ESTIMATED BLOOD LOSS: Less than 1 mL.



IRRIGATION: One liter of dilute gentamicin solution with a low-pressure pulse 
lavage system.



DESCRIPTION OF OPERATION: On 10/16/2020 this 88-year-old white male was taken 
from his hospital room to the operating room and placed on the operating table 
in the supine position. Following the induction of intravenous (IV) sedation and
local and regional anesthesia, the right lower extremity was prepped and draped 
in the usual aseptic manner. Attention was directed to the patient's right foot,
where a racket-shaped incision was placed on the base of the patient's right 
hallux. Dissection was carried straight to bone. The metatarsophalangeal joint 
was disarticulated, and the toe was sent for aerobic and anaerobic culture. All 
bleeders as encountered were electrocoagulated. The skin was closed with 3-0 
nylon suture in a simple interrupted-type fashion. Compressive bandages were 
applied consisting of Adaptic, 4 x 4's, 4 x 4 splints, Cristin, Kerlix, and Coban 
under no compression. Patient having apparently tolerated the procedure well was
taken from the operating room (OR) to the recovery room for further monitoring 
by the anesthesia department. Postoperative instructions were given upon 
discharge.

JANET

## 2020-10-20 VITALS — SYSTOLIC BLOOD PRESSURE: 139 MMHG | DIASTOLIC BLOOD PRESSURE: 64 MMHG

## 2020-10-20 VITALS — DIASTOLIC BLOOD PRESSURE: 67 MMHG | SYSTOLIC BLOOD PRESSURE: 182 MMHG

## 2020-10-20 LAB
ALBUMIN SERPL BCG-MCNC: 1.4 GM/DL (ref 3.2–5.2)
ALT SERPL W P-5'-P-CCNC: 47 U/L (ref 12–78)
BASOPHILS # BLD AUTO: 0 10^3/UL (ref 0–0.2)
BASOPHILS NFR BLD AUTO: 0.1 % (ref 0–1)
BILIRUB SERPL-MCNC: 0.3 MG/DL (ref 0.2–1)
BUN SERPL-MCNC: 23 MG/DL (ref 7–18)
CALCIUM SERPL-MCNC: 8 MG/DL (ref 8.8–10.2)
CHLORIDE SERPL-SCNC: 101 MEQ/L (ref 98–107)
CO2 SERPL-SCNC: 33 MEQ/L (ref 21–32)
CREAT SERPL-MCNC: 1.04 MG/DL (ref 0.7–1.3)
CRP SERPL-MCNC: 7.07 MG/DL (ref 0–0.3)
EOSINOPHIL # BLD AUTO: 0.6 10^3/UL (ref 0–0.5)
EOSINOPHIL NFR BLD AUTO: 4.2 % (ref 0–3)
GFR SERPL CREATININE-BSD FRML MDRD: > 60 ML/MIN/{1.73_M2} (ref 35–?)
GLUCOSE SERPL-MCNC: 149 MG/DL (ref 70–100)
HCT VFR BLD AUTO: 28.7 % (ref 42–52)
HGB BLD-MCNC: 9.2 G/DL (ref 13.5–17.5)
LYMPHOCYTES # BLD AUTO: 0.9 10^3/UL (ref 1.5–5)
LYMPHOCYTES NFR BLD AUTO: 6.7 % (ref 24–44)
MCH RBC QN AUTO: 27.4 PG (ref 27–33)
MCHC RBC AUTO-ENTMCNC: 32.1 G/DL (ref 32–36.5)
MCV RBC AUTO: 85.4 FL (ref 80–96)
MONOCYTES # BLD AUTO: 0.7 10^3/UL (ref 0–0.8)
MONOCYTES NFR BLD AUTO: 5.3 % (ref 0–5)
NEUTROPHILS # BLD AUTO: 11.1 10^3/UL (ref 1.5–8.5)
NEUTROPHILS NFR BLD AUTO: 82 % (ref 36–66)
PLATELET # BLD AUTO: 239 10^3/UL (ref 150–450)
POTASSIUM SERPL-SCNC: 4.2 MEQ/L (ref 3.5–5.1)
PROT SERPL-MCNC: 5.3 GM/DL (ref 6.4–8.2)
RBC # BLD AUTO: 3.36 10^6/UL (ref 4.3–6.1)
SODIUM SERPL-SCNC: 138 MEQ/L (ref 136–145)
WBC # BLD AUTO: 13.5 10^3/UL (ref 4–10)

## 2020-10-20 RX ADMIN — LEVOTHYROXINE SODIUM SCH MCG: 50 TABLET ORAL at 06:29

## 2020-10-20 RX ADMIN — PREGABALIN SCH MG: 100 CAPSULE ORAL at 15:06

## 2020-10-20 RX ADMIN — INSULIN DETEMIR SCH UNITS: 100 INJECTION, SOLUTION SUBCUTANEOUS at 21:47

## 2020-10-20 RX ADMIN — PREGABALIN SCH MG: 100 CAPSULE ORAL at 08:19

## 2020-10-20 RX ADMIN — TAMSULOSIN HYDROCHLORIDE SCH MG: 0.4 CAPSULE ORAL at 08:19

## 2020-10-20 RX ADMIN — CEPHALEXIN SCH MG: 500 CAPSULE ORAL at 12:33

## 2020-10-20 RX ADMIN — PRAVASTATIN SODIUM SCH MG: 20 TABLET ORAL at 21:48

## 2020-10-20 RX ADMIN — INSULIN DETEMIR SCH UNITS: 100 INJECTION, SOLUTION SUBCUTANEOUS at 08:19

## 2020-10-20 RX ADMIN — ACETAMINOPHEN PRN MG: 500 TABLET ORAL at 15:06

## 2020-10-20 RX ADMIN — DEXTROSE MONOHYDRATE SCH MLS/HR: 5 INJECTION INTRAVENOUS at 06:29

## 2020-10-20 RX ADMIN — CEPHALEXIN SCH MG: 500 CAPSULE ORAL at 17:16

## 2020-10-20 RX ADMIN — INSULIN LISPRO SCH UNITS: 100 INJECTION, SOLUTION INTRAVENOUS; SUBCUTANEOUS at 17:17

## 2020-10-20 RX ADMIN — CLOPIDOGREL BISULFATE SCH MG: 75 TABLET ORAL at 08:20

## 2020-10-20 RX ADMIN — HYDROCODONE BITARTRATE AND ACETAMINOPHEN SCH TAB: 5; 325 TABLET ORAL at 08:20

## 2020-10-20 RX ADMIN — KETOROLAC TROMETHAMINE PRN MG: 30 INJECTION, SOLUTION INTRAMUSCULAR at 12:33

## 2020-10-20 RX ADMIN — RIVAROXABAN SCH MG: 10 TABLET, FILM COATED ORAL at 08:21

## 2020-10-20 RX ADMIN — INSULIN LISPRO SCH UNITS: 100 INJECTION, SOLUTION INTRAVENOUS; SUBCUTANEOUS at 08:19

## 2020-10-20 RX ADMIN — INSULIN LISPRO SCH UNITS: 100 INJECTION, SOLUTION INTRAVENOUS; SUBCUTANEOUS at 21:00

## 2020-10-20 RX ADMIN — ACETAMINOPHEN PRN MG: 500 TABLET ORAL at 06:30

## 2020-10-20 RX ADMIN — RIVAROXABAN SCH MG: 10 TABLET, FILM COATED ORAL at 21:47

## 2020-10-20 RX ADMIN — INSULIN LISPRO SCH UNITS: 100 INJECTION, SOLUTION INTRAVENOUS; SUBCUTANEOUS at 13:05

## 2020-10-20 RX ADMIN — PREGABALIN SCH MG: 100 CAPSULE ORAL at 21:48

## 2020-10-20 RX ADMIN — HYDROCODONE BITARTRATE AND ACETAMINOPHEN SCH TAB: 5; 325 TABLET ORAL at 21:48

## 2020-10-20 RX ADMIN — FUROSEMIDE SCH MG: 40 TABLET ORAL at 08:20

## 2020-10-20 NOTE — CR
ADVANCED WOUND CARE CONSULTATION



DATE OF CONSULTATION: 10/19/2020



REQUESTING PHYSICIAN: Dr. Rock



REASON FOR CONSULTATION: Wound care suggestions for sacral deep tissue injury 
and stage one pressure injury  



HISTORY OF PRESENT ILLNESS: This is an 88-year-old male who recently underwent a
right lower extremity, arterial bypass in [place] for limb salvage. He was 
transferred to [facility] and seen by Dr. Plaza, Podiatry for 
an ischemic right great toe which required amputation. A closed amputation was 
performed. The patient is approximately 4 days postoperative with stable vital 
signs and is on Zosyn IV q.  He is mildly confused nonambulatory and at present 
has been confined to bedrest.   In that regard,  heel flow boot should be 
applied bilaterally to avoid any potential deep tissue injury or pressure 
injuries involving the heels, for which she is at high risk of developing. In 
terms of his sacrum, there are two stage  II pressure injury present with an 
adjacent area consistent with a deep tissue injury. These wounds have not been 
measured and are estimated to be approximately 7.0 cm by 4.0 cm with a wound 
depth of less than 1.0 cm. by the nurse who was present. The periwounds are 
intact without evidence of ischemia, erythema or maceration. The patient is a 
diabetic and the last two Accu-Chek readings were 232 and 179. The patient is on
a sliding scale of insulin. Is important to keep the patient's blood glucose at 
180 or less as higher levels impair wound healing.

  Our suggestions: clean the wounds with Vashe wound cleanser, soaking a 4 by 4 
gauze sponge with the cleanser and applying it to the wounds for 10 minutes. A 
sacral foam dressing should then be applied and changed on a daily basis. Skin 
prep should be utilized to protect the periwound. The patient should be 
offloaded, changing position every 2 hours and utilizing an offloading mattress.
If the patient is to get out of bed, a ROHO cushion should be utilized. The 
patient should avoid semi Fowlers position while in bed ie. 45 degrees while 
taking  meals, as this places significant pressure on the sacral and coccyx 
area. More appropriately, 90 degrees sitting position would be preferred. It 
should be kept in mind that deep tissue injuries and stage I or II pressure 
injuries can rapidly deteriorate and become stage IV pressure injuries. The 
patients diet should be supplemented with Glucerna and Aroldo amino acid 
supplement twice daily. The patient is a high risk for deterioration and wound 
progression of the sacral area, and is at significant risk for developing deep 
tissue injuries or pressure injuries to his heels. Again for this reason,  heel 
flow boots bilaterally have been ordered in an effort to avoid this. 

JANET

## 2020-10-20 NOTE — IPN
DATE:  10/19/2020



CHIEF COMPLAINT:  This 88-year-old male was seen today for an evaluation status 
post amputation of the right hallux. The patient is alert and well-oriented 
today and talking and eating breakfast at the bedside. He denies pain of his 
foot and denies shortness of breath or chest pain. 



The dressing is intact on the right foot. The dressing was removed. The incision
is well-coapted and maintained. There is reactional erythema seen but no 
discharge from the wound site. Culture was reviewed revealing growth of Staph 
aureus and Strep Group B. 



The patients laboratory studies reveal a white count to be 13.0, C-reactive 
protein not performed. 



ASSESSMENT:  Healing well, status post amputation of right hallux. 



PLAN:  A dry sterile dressing was applied. The patient is presently on Zosyn for
coverage of his bacteria. When the patients culture and sensitivity becomes 
available and the patient is cleared medically he can be discharged on 
appropriate antibiotics and will be seen in my office for follow-up.

JANET

## 2020-10-20 NOTE — IPNPDOC
Text Note


Date of Service


The patient was seen on 10/20/20.





NOTE


Subjective: Worked a little with PT yesterday. 





Objective:


Vitals (See below)


General: Lying in bed,  AAOx3


HEENT: NC, AT, moist mucous membranes, anicteric eyes.


CVS: +S1S2 Regular, normal rate, No Rub/ murmur or gallop


Lungs: Fair air entry b/l, no appreciable wheezing, rhonchi or rales


Abdomen: Soft, nondistended, nontender, bowel sounds normal. 


Extremities: No evidence of edema, - Calf tenderness, right first digit dry 

gangrene surrounding erythema, warmth and odor 


Skin: sacrum/ coccyx stage 2 pressure ulcer with deep tissue injury in part of 

it 8 cm x 5 cm, stage 2 ulcer on the left buttock laterally with granulation 

tissues 5 cm x 4 cm was ablister before which has now broken,  and a small fluid

filled blister on the left buttock above the stage 2 pressure injury.  





Labs and radiology reviewed





Assessment and plan: Patient is an 88-year-old  male with a PMHx of 

HTN, NIDDM2, PVD (s/p RLE femoral-popliteal bypass), Hx of Chronic RLE digit 

necrosis, DLP, Hypothyroidism, Neuropathy, BPH, GERD who presented to the 

hospital after experiencing 3-4 days of aches and pains all over his body. 

Patient was admitted to the hospital service for sepsis secondary to his right 

lower extremities cellulitis/osteomyelitis. Patient has had first digit necrosis

/ gangrene.





Sepsis 


due to Cellulitis/osteomyelitis and Gangrene of R foot Hallux


s/p  amputation of RLE first digit on 10/16


Blood cultures 10/14, 1/2 streptococcus agalactiae


Wound cultures 10/14: MSSA and Streptococcus agalactiae


stop Zosyn start keflex. 


PT/OT 





PAD.


Patient and daughters had refused transfer to [facility] for further 

intervention (recent intervention 2 weeks ago)





Sacral/ cocyx pressure ulcer present on admission


Blisters and new pressure ulcer onthe left buttock which was a blister before. 


frequent repositioning. 


optifoam dressing. 


Wound consult on Monday. 





s/p A. fib with RVR


Now rate controlled. 


ECHO complete; report pending 


restarted xarelto. 





s/p Lactic acidosis





Hypokalemia


replaced





HTN


was hypotensive on presentation, now normal 


coreg restarted. Lasix started


hold  amlodipine





NIDDM2 with Hyperglycemia 


ISS, lispro


Levemir





PVD  s/p RLE femoral-popliteal bypass


Plavix , xarelto





DLP


Pravastatin 





Hypothyroidism


Levothyroxine 





Back pain/ Neuropathy


Lyrica, tylenol, toradol. norco. 





BPH


Tamsulosin 





GI prophylaxis / GERD


Protonix





DVT prophylaxis : xarelto





Code status:


DNR / DNI





Disposition: PT evaluation. i suspect he will need continued rehab on discharge.







Daughter/healthcare proxy Kate Yanez [phone]





VS,Jeffery, I+O


VS, Jeffery, I+O


Laboratory Tests


10/20/20 05:24











Vital Signs








  Date Time  Temp Pulse Resp B/P (MAP) Pulse Ox O2 Delivery O2 Flow Rate FiO2


 


10/20/20 08:25  78  114/50    


 


10/20/20 08:20   16     


 


10/19/20 22:00 98.2    95 Room Air  














I&O- Last 24 Hours up to 6 AM 


 


 10/20/20





 06:00


 


Intake Total 980 ml


 


Output Total 900 ml


 


Balance 80 ml

















KADEEM MCCORMACK MD                   Oct 20, 2020 11:44

## 2020-10-21 VITALS — DIASTOLIC BLOOD PRESSURE: 78 MMHG | SYSTOLIC BLOOD PRESSURE: 180 MMHG

## 2020-10-21 VITALS — SYSTOLIC BLOOD PRESSURE: 126 MMHG | DIASTOLIC BLOOD PRESSURE: 91 MMHG

## 2020-10-21 VITALS — DIASTOLIC BLOOD PRESSURE: 63 MMHG | SYSTOLIC BLOOD PRESSURE: 152 MMHG

## 2020-10-21 LAB
ALBUMIN SERPL BCG-MCNC: 1.7 GM/DL (ref 3.2–5.2)
ALT SERPL W P-5'-P-CCNC: 47 U/L (ref 12–78)
BASOPHILS # BLD AUTO: 0 10^3/UL (ref 0–0.2)
BASOPHILS NFR BLD AUTO: 0.2 % (ref 0–1)
BILIRUB SERPL-MCNC: 0.4 MG/DL (ref 0.2–1)
BUN SERPL-MCNC: 22 MG/DL (ref 7–18)
CALCIUM SERPL-MCNC: 8.3 MG/DL (ref 8.8–10.2)
CHLORIDE SERPL-SCNC: 99 MEQ/L (ref 98–107)
CO2 SERPL-SCNC: 31 MEQ/L (ref 21–32)
CREAT SERPL-MCNC: 0.83 MG/DL (ref 0.7–1.3)
EOSINOPHIL # BLD AUTO: 0.5 10^3/UL (ref 0–0.5)
EOSINOPHIL NFR BLD AUTO: 3.6 % (ref 0–3)
GFR SERPL CREATININE-BSD FRML MDRD: > 60 ML/MIN/{1.73_M2} (ref 35–?)
GLUCOSE SERPL-MCNC: 197 MG/DL (ref 70–100)
HCT VFR BLD AUTO: 31.3 % (ref 42–52)
HGB BLD-MCNC: 10 G/DL (ref 13.5–17.5)
LYMPHOCYTES # BLD AUTO: 1.1 10^3/UL (ref 1.5–5)
LYMPHOCYTES NFR BLD AUTO: 7.4 % (ref 24–44)
MCH RBC QN AUTO: 27.2 PG (ref 27–33)
MCHC RBC AUTO-ENTMCNC: 31.9 G/DL (ref 32–36.5)
MCV RBC AUTO: 85.1 FL (ref 80–96)
MONOCYTES # BLD AUTO: 0.9 10^3/UL (ref 0–0.8)
MONOCYTES NFR BLD AUTO: 6 % (ref 0–5)
NEUTROPHILS # BLD AUTO: 11.6 10^3/UL (ref 1.5–8.5)
NEUTROPHILS NFR BLD AUTO: 81.4 % (ref 36–66)
PLATELET # BLD AUTO: 303 10^3/UL (ref 150–450)
POTASSIUM SERPL-SCNC: 4.7 MEQ/L (ref 3.5–5.1)
PROT SERPL-MCNC: 5.5 GM/DL (ref 6.4–8.2)
RBC # BLD AUTO: 3.68 10^6/UL (ref 4.3–6.1)
SODIUM SERPL-SCNC: 133 MEQ/L (ref 136–145)
WBC # BLD AUTO: 14.3 10^3/UL (ref 4–10)

## 2020-10-21 RX ADMIN — LEVOTHYROXINE SODIUM SCH MCG: 50 TABLET ORAL at 06:05

## 2020-10-21 RX ADMIN — CLOPIDOGREL BISULFATE SCH MG: 75 TABLET ORAL at 08:52

## 2020-10-21 RX ADMIN — RIVAROXABAN SCH MG: 10 TABLET, FILM COATED ORAL at 08:50

## 2020-10-21 RX ADMIN — TAMSULOSIN HYDROCHLORIDE SCH MG: 0.4 CAPSULE ORAL at 08:50

## 2020-10-21 RX ADMIN — INSULIN LISPRO SCH UNITS: 100 INJECTION, SOLUTION INTRAVENOUS; SUBCUTANEOUS at 20:23

## 2020-10-21 RX ADMIN — HYDROCODONE BITARTRATE AND ACETAMINOPHEN SCH TAB: 5; 325 TABLET ORAL at 20:22

## 2020-10-21 RX ADMIN — PREGABALIN SCH MG: 100 CAPSULE ORAL at 08:52

## 2020-10-21 RX ADMIN — INSULIN LISPRO SCH UNITS: 100 INJECTION, SOLUTION INTRAVENOUS; SUBCUTANEOUS at 08:48

## 2020-10-21 RX ADMIN — PRAVASTATIN SODIUM SCH MG: 20 TABLET ORAL at 20:22

## 2020-10-21 RX ADMIN — PREGABALIN SCH MG: 100 CAPSULE ORAL at 20:22

## 2020-10-21 RX ADMIN — INSULIN LISPRO SCH UNITS: 100 INJECTION, SOLUTION INTRAVENOUS; SUBCUTANEOUS at 17:10

## 2020-10-21 RX ADMIN — CEPHALEXIN SCH MG: 500 CAPSULE ORAL at 23:34

## 2020-10-21 RX ADMIN — PREGABALIN SCH MG: 100 CAPSULE ORAL at 17:10

## 2020-10-21 RX ADMIN — CEPHALEXIN SCH MG: 500 CAPSULE ORAL at 17:10

## 2020-10-21 RX ADMIN — ACETAMINOPHEN PRN MG: 500 TABLET ORAL at 06:06

## 2020-10-21 RX ADMIN — FUROSEMIDE SCH MG: 40 TABLET ORAL at 08:51

## 2020-10-21 RX ADMIN — INSULIN DETEMIR SCH UNITS: 100 INJECTION, SOLUTION SUBCUTANEOUS at 20:22

## 2020-10-21 RX ADMIN — HYDROCODONE BITARTRATE AND ACETAMINOPHEN SCH TAB: 5; 325 TABLET ORAL at 08:52

## 2020-10-21 RX ADMIN — RIVAROXABAN SCH MG: 10 TABLET, FILM COATED ORAL at 20:22

## 2020-10-21 RX ADMIN — INSULIN DETEMIR SCH UNITS: 100 INJECTION, SOLUTION SUBCUTANEOUS at 08:49

## 2020-10-21 RX ADMIN — CEPHALEXIN SCH MG: 500 CAPSULE ORAL at 06:05

## 2020-10-21 RX ADMIN — INSULIN LISPRO SCH UNITS: 100 INJECTION, SOLUTION INTRAVENOUS; SUBCUTANEOUS at 12:48

## 2020-10-21 RX ADMIN — CEPHALEXIN SCH MG: 500 CAPSULE ORAL at 00:39

## 2020-10-21 RX ADMIN — CEPHALEXIN SCH MG: 500 CAPSULE ORAL at 12:48

## 2020-10-21 NOTE — IPNPDOC
Text Note


Date of Service


The patient was seen on 10/21/20.





NOTE


Subjective: Subjective: Patient reports that he stood up yesterday. this am he 

says he had breakfast sitting by the side of bed but after an hour of sitting  

his back became very stiff and painful. Seen by  Dr Stillerman yesterday. 





Objective:


Vitals (See below)


General: Lying in bed,  AAOx3


HEENT: NC, AT, moist mucous membranes, anicteric eyes.


CVS: +S1S2 Regular, normal rate, No Rub/ murmur or gallop


Lungs: Fair air entry b/l, no appreciable wheezing, rhonchi or rales


Abdomen: Soft, nondistended, nontender, bowel sounds normal. 


Extremities: No evidence of edema, - Calf tenderness, right first digit dry 

gangrene surrounding erythema, warmth and odor 


Skin: sacrum/ coccyx stage 2 pressure ulcer with deep tissue injury in part of 

it 8 cm x 5 cm, stage 2 ulcer on the left buttock laterally with granulation 

tissues 5 cm x 4 cm was ablister before which has now broken,  and a small fluid

filled blister on the left buttock above the stage 2 pressure injury.  





Labs and radiology reviewed





Assessment and plan: Patient is an 88-year-old  male with a PMHx of 

HTN, NIDDM2, PVD (s/p RLE femoral-popliteal bypass), Hx of Chronic RLE digit 

necrosis, DLP, Hypothyroidism, Neuropathy, BPH, GERD who presented to the 

hospital after experiencing 3-4 days of aches and pains all over his body. 

Patient was admitted to the hospital service for sepsis secondary to his right 

lower extremities cellulitis/osteomyelitis. Patient has had first digit necrosis

/ gangrene.





Sepsis 


due to Cellulitis/osteomyelitis and Gangrene of R foot Hallux


s/p  amputation of RLE first digit on 10/16


Blood cultures 1/4 streptococcus agalactiae


Wound cultures from OR MSSA and Streptococcus agalactiae


Keflex till 10/26/20


PT/OT 





PAD.


Patient and daughters had refused transfer to [facility] for further 

intervention (recent intervention 2 weeks ago)





Sacral/ cocyx pressure ulcer present on admission


Blisters and new pressure ulcer onthe left buttock which was a blister before. 


frequent repositioning. 


Dr Stillerman following. Wound care as per Dr Stillerman. 





s/p A. fib with RVR


Now rate controlled. 


ECHO complete


No vegetations, no valvular abnormalities.  Normal left ventricle internal 

dimensions and wall thickness. Normal regional left ventricular (LV) wall motion

and wall thickening. Normal left ventricular (LV) systolic function. Left 

ventricular ejection fraction (LVEF) 60% by visual estimate. Unable to determine

left ventricular (LV) diastolic function in the setting of atrial fibrillation.


restarted xarelto. 





s/p Lactic acidosis





Hypokalemia


replaced





HTN


was hypotensive on presentation, now normal 


coreg restarted. Lasix started


hold  amlodipine





NIDDM2 with Hyperglycemia 


ISS, lispro


Levemir





PVD  s/p RLE femoral-popliteal bypass


Plavix , xarelto





DLP


Pravastatin 





Hypothyroidism


Levothyroxine 





Back pain/ Neuropathy


Lyrica, tylenol, toradol. norco. 





BPH


Tamsulosin 





GI prophylaxis / GERD


Protonix





DVT prophylaxis : xarelto





Code status:


DNR / DNI





Disposition: Continued rehab 





Daughter/healthcare proxy Kate Yanez [phone]





VS,Jeffery, I+O


VS, Jeffery, I+O


Laboratory Tests


10/21/20 05:53











Vital Signs








  Date Time  Temp Pulse Resp B/P (MAP) Pulse Ox O2 Delivery O2 Flow Rate FiO2


 


10/21/20 06:00 98.2 86 18 152/63 (92) 97 Room Air  














I&O- Last 24 Hours up to 6 AM 


 


 10/21/20





 07:00


 


Intake Total 1520 ml


 


Output Total 1225 ml


 


Balance 295 ml

















KADEEM MCCORMACK MD                   Oct 21, 2020 07:28

## 2020-10-22 VITALS — DIASTOLIC BLOOD PRESSURE: 64 MMHG | SYSTOLIC BLOOD PRESSURE: 141 MMHG

## 2020-10-22 VITALS — DIASTOLIC BLOOD PRESSURE: 94 MMHG | SYSTOLIC BLOOD PRESSURE: 126 MMHG

## 2020-10-22 VITALS — SYSTOLIC BLOOD PRESSURE: 141 MMHG | DIASTOLIC BLOOD PRESSURE: 86 MMHG

## 2020-10-22 RX ADMIN — PREGABALIN SCH MG: 100 CAPSULE ORAL at 21:15

## 2020-10-22 RX ADMIN — INSULIN DETEMIR SCH UNITS: 100 INJECTION, SOLUTION SUBCUTANEOUS at 08:01

## 2020-10-22 RX ADMIN — RIVAROXABAN SCH MG: 10 TABLET, FILM COATED ORAL at 08:05

## 2020-10-22 RX ADMIN — LEVOTHYROXINE SODIUM SCH MCG: 50 TABLET ORAL at 05:29

## 2020-10-22 RX ADMIN — CEPHALEXIN SCH MG: 500 CAPSULE ORAL at 12:58

## 2020-10-22 RX ADMIN — INSULIN LISPRO SCH UNITS: 100 INJECTION, SOLUTION INTRAVENOUS; SUBCUTANEOUS at 18:16

## 2020-10-22 RX ADMIN — PREGABALIN SCH MG: 100 CAPSULE ORAL at 08:05

## 2020-10-22 RX ADMIN — TAMSULOSIN HYDROCHLORIDE SCH MG: 0.4 CAPSULE ORAL at 08:03

## 2020-10-22 RX ADMIN — METRONIDAZOLE SCH MG: 500 TABLET ORAL at 15:28

## 2020-10-22 RX ADMIN — INSULIN LISPRO SCH UNITS: 100 INJECTION, SOLUTION INTRAVENOUS; SUBCUTANEOUS at 21:16

## 2020-10-22 RX ADMIN — CEPHALEXIN SCH MG: 500 CAPSULE ORAL at 18:16

## 2020-10-22 RX ADMIN — INSULIN LISPRO SCH UNITS: 100 INJECTION, SOLUTION INTRAVENOUS; SUBCUTANEOUS at 08:02

## 2020-10-22 RX ADMIN — PRAVASTATIN SODIUM SCH MG: 20 TABLET ORAL at 21:15

## 2020-10-22 RX ADMIN — INSULIN DETEMIR SCH UNITS: 100 INJECTION, SOLUTION SUBCUTANEOUS at 21:17

## 2020-10-22 RX ADMIN — PREGABALIN SCH MG: 100 CAPSULE ORAL at 15:28

## 2020-10-22 RX ADMIN — ACETAMINOPHEN PRN MG: 500 TABLET ORAL at 14:03

## 2020-10-22 RX ADMIN — CEPHALEXIN SCH MG: 500 CAPSULE ORAL at 05:29

## 2020-10-22 RX ADMIN — INSULIN LISPRO SCH UNITS: 100 INJECTION, SOLUTION INTRAVENOUS; SUBCUTANEOUS at 12:59

## 2020-10-22 RX ADMIN — METRONIDAZOLE SCH MG: 500 TABLET ORAL at 21:14

## 2020-10-22 RX ADMIN — CLOPIDOGREL BISULFATE SCH MG: 75 TABLET ORAL at 08:05

## 2020-10-22 RX ADMIN — HYDROCODONE BITARTRATE AND ACETAMINOPHEN SCH TAB: 5; 325 TABLET ORAL at 08:03

## 2020-10-22 RX ADMIN — RIVAROXABAN SCH MG: 10 TABLET, FILM COATED ORAL at 21:15

## 2020-10-22 RX ADMIN — HYDROCODONE BITARTRATE AND ACETAMINOPHEN SCH TAB: 5; 325 TABLET ORAL at 21:17

## 2020-10-22 RX ADMIN — FUROSEMIDE SCH MG: 40 TABLET ORAL at 08:05

## 2020-10-22 NOTE — IPNPDOC
Text Note


Date of Service


The patient was seen on 10/22/20.





NOTE


Subjective: seen at bedside while he was working with PT. Was able to sit up 

after some coaxing with 1 assist. Complains that his back is very sore. Very 

weak and tired. Not much motivated today. Family would like to take him home 

with 24 x 7 care. 





Objective:


Vitals (See below)


General: Lying in bed,  AAOx3


HEENT: NC, AT, moist mucous membranes, anicteric eyes.


CVS: +S1S2 Regular, normal rate, No Rub/ murmur or gallop


Lungs: Fair air entry b/l, no appreciable wheezing, rhonchi or rales


Abdomen: Soft, nondistended, nontender, bowel sounds normal. 


Extremities: No evidence of edema, - Calf tenderness, right first digit dry 

gangrene surrounding erythema, warmth and odor 


Skin: sacrum/ coccyx stage 2 pressure ulcer with deep tissue injury in part of 

it 8 cm x 5 cm, stage 2 ulcer on the left buttock laterally with granulation 

tissues 5 cm x 4 cm was ablister before which has now broken,  and a small fluid

filled blister on the left buttock above the stage 2 pressure injury.  





Labs and radiology reviewed





Assessment and plan: Patient is an 88-year-old  male with a PMHx of 

HTN, NIDDM2, PVD (s/p RLE femoral-popliteal bypass), Hx of Chronic RLE digit 

necrosis, DLP, Hypothyroidism, Neuropathy, BPH, GERD who presented to the 

hospital after experiencing 3-4 days of aches and pains all over his body. 

Patient was admitted to the hospital service for sepsis secondary to his right 

lower extremities cellulitis/osteomyelitis. Patient has had first digit necrosis

/ gangrene.





Sepsis 


due to Cellulitis/osteomyelitis and Gangrene of R foot Hallux


s/p  amputation of RLE first digit on 10/16


Blood cultures 1/4 streptococcus agalactiae


Wound cultures from OR MSSA and Streptococcus agalactiae and anaerobic cocci


Keflex till 10/26/20 will add metronidazole also. 


PT/OT 





PAD.


Patient and daughters had refused transfer to [facility] for further 

intervention (recent intervention 2 weeks ago)





Sacral/ cocyx pressure ulcer present on admission


Blisters and new pressure ulcer onthe left buttock which was a blister before. 


frequent repositioning. 


Dr Stillerman following. Wound care as per Dr Stillerman. 





s/p A. fib with RVR


Now rate controlled. 


ECHO complete


No vegetations, no valvular abnormalities.  Normal left ventricle internal 

dimensions and wall thickness. Normal regional left ventricular (LV) wall motion

and wall thickening. Normal left ventricular (LV) systolic function. Left 

ventricular ejection fraction (LVEF) 60% by visual estimate. Unable to determine

left ventricular (LV) diastolic function in the setting of atrial fibrillation.


restarted xarelto. 





s/p Lactic acidosis





Hypokalemia


replaced





HTN


was hypotensive on presentation, now normal 


coreg restarted. Lasix started


hold  amlodipine





NIDDM2 with Hyperglycemia 


ISS, lispro


Levemir





PVD  s/p RLE femoral-popliteal bypass


Plavix , xarelto





DLP


Pravastatin 





Hypothyroidism


Levothyroxine 





Back pain/ Neuropathy


Lyrica, tylenol, toradol. norco. 





BPH


Tamsulosin 





GI prophylaxis / GERD


Protonix





DVT prophylaxis : xarelto





Code status:


DNR / DNI





Disposition: Continued rehab / home with 24 x7 care. 





Daughter/healthcare proxy Kate Yanez [phone]





VS,Fishbone, I+O


VS, Fishbone, I+O





Vital Signs








  Date Time  Temp Pulse Resp B/P (MAP) Pulse Ox O2 Delivery O2 Flow Rate FiO2


 


10/22/20 09:00   17     


 


10/22/20 08:06  89  145/62    


 


10/22/20 05:28 98.2    96 Room Air  














I&O- Last 24 Hours up to 6 AM 


 


 10/22/20





 07:00


 


Intake Total 970 ml


 


Output Total 1375 ml


 


Balance -405 ml

















KADEEM MCCORMACK MD                   Oct 22, 2020 12:47

## 2020-10-23 VITALS — SYSTOLIC BLOOD PRESSURE: 110 MMHG | DIASTOLIC BLOOD PRESSURE: 42 MMHG

## 2020-10-23 VITALS — SYSTOLIC BLOOD PRESSURE: 117 MMHG | DIASTOLIC BLOOD PRESSURE: 42 MMHG

## 2020-10-23 RX ADMIN — INSULIN DETEMIR SCH UNITS: 100 INJECTION, SOLUTION SUBCUTANEOUS at 08:21

## 2020-10-23 RX ADMIN — CEPHALEXIN SCH MG: 500 CAPSULE ORAL at 06:13

## 2020-10-23 RX ADMIN — INSULIN LISPRO SCH UNITS: 100 INJECTION, SOLUTION INTRAVENOUS; SUBCUTANEOUS at 14:17

## 2020-10-23 RX ADMIN — FUROSEMIDE SCH MG: 40 TABLET ORAL at 08:17

## 2020-10-23 RX ADMIN — CEPHALEXIN SCH MG: 500 CAPSULE ORAL at 00:11

## 2020-10-23 RX ADMIN — PREGABALIN SCH MG: 100 CAPSULE ORAL at 16:58

## 2020-10-23 RX ADMIN — HYDROCODONE BITARTRATE AND ACETAMINOPHEN SCH TAB: 5; 325 TABLET ORAL at 08:17

## 2020-10-23 RX ADMIN — CLOPIDOGREL BISULFATE SCH MG: 75 TABLET ORAL at 08:19

## 2020-10-23 RX ADMIN — PREGABALIN SCH MG: 100 CAPSULE ORAL at 08:19

## 2020-10-23 RX ADMIN — METRONIDAZOLE SCH MG: 500 TABLET ORAL at 16:58

## 2020-10-23 RX ADMIN — CEPHALEXIN SCH MG: 500 CAPSULE ORAL at 14:16

## 2020-10-23 RX ADMIN — METRONIDAZOLE SCH MG: 500 TABLET ORAL at 08:16

## 2020-10-23 RX ADMIN — TAMSULOSIN HYDROCHLORIDE SCH MG: 0.4 CAPSULE ORAL at 08:16

## 2020-10-23 RX ADMIN — RIVAROXABAN SCH MG: 10 TABLET, FILM COATED ORAL at 08:19

## 2020-10-23 RX ADMIN — ACETAMINOPHEN PRN MG: 500 TABLET ORAL at 06:13

## 2020-10-23 RX ADMIN — INSULIN LISPRO SCH UNITS: 100 INJECTION, SOLUTION INTRAVENOUS; SUBCUTANEOUS at 08:20

## 2020-10-23 RX ADMIN — LEVOTHYROXINE SODIUM SCH MCG: 50 TABLET ORAL at 06:13

## 2020-10-24 NOTE — DS.PDOC
Discharge Summary


General


Date of Admission


Oct 14, 2020 at 18:12


Date of Discharge


10/23/20





Discharge Summary


PROCEDURES PERFORMED DURING STAY: [None].





DISCHARGE DIAGNOSES:


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


Right foot cellulitis


Sepsis


Bacteremia


Sacral/ Coccyx pressure ulcer 


A. fib with RVR


Depression


Generalized deconditioning 





SECONDARY DIAGNOSIS:


HTN, NIDDM2, PAD (s/p RLE femoral-popliteal bypass), Hx of Chronic RLE digit 

necrosis, DLP, Hypothyroidism, Neuropathy, BPH, GERD, A fib, Back pain/ 

Neuropathy





COMPLICATIONS/CHIEF COMPLAINT: Sepsis.





HOSPITAL COURSE: Patient is an 88-year-old  male with a PMHx of HTN, 

NIDDM2, PVD (s/p RLE femoral-popliteal bypass), Hx of Chronic RLE digit 

necrosis, DLP, Hypothyroidism, Neuropathy, BPH, GERD who presented to the 

hospital after experiencing 3-4 days of aches and pains all over his body. 

Patient was admitted to the hospital service for sepsis secondary to his right 

lower extremities cellulitis/osteomyelitis. Patient has had first digit necrosis

 / gangrene.





Sepsis 


due to Cellulitis/osteomyelitis and Gangrene of R foot Hallux


s/p  amputation of RLE first digit on 10/16


Blood cultures 1/4 streptococcus agalactiae


Wound cultures from OR MSSA and Streptococcus agalactiae and anaerobic cocci


Keflex till 10/26/20  and metronidazole also. 


PT/OT 





Sacral/ cocyx pressure ulcer present on admission


Blisters and new pressure ulcer onthe left buttock which was a blister before. 


frequent repositioning. 


Dr Stillerman following. Wound care as per Dr Stillerman. 





s/p A. fib with RVR


Now rate controlled. 


ECHO complete


No vegetations, no valvular abnormalities.  Normal left ventricle internal 

dimensions and wall thickness. Normal regional left ventricular (LV) wall motion

 and wall thickening. Normal left ventricular (LV) systolic function. Left 

ventricular ejection fraction (LVEF) 60% by visual estimate. Unable to determine

 left ventricular (LV) diastolic function in the setting of atrial fibrillation.


restarted xarelto. 





s/p Lactic acidosis





Hypokalemia


replaced





HTN


was hypotensive on presentation, now normal 


coreg , amlodipine and lasix





NIDDM2 with Hyperglycemia 


glipizide





PAD  s/p RLE femoral-popliteal bypass


Patient and daughters had refused transfer to [facility] for further 

intervention (recent intervention 2 weeks ago)


Plavix , xarelto





DLP


Pravastatin 





Hypothyroidism


Levothyroxine 





Back pain/ Neuropathy


Lyrica, tylenol, norco. 





BPH


Tamsulosin 





GI prophylaxis / GERD


Protonix





DISCHARGE MEDICATIONS: Please see below.


 


ALLERGIES: Please see below.





PHYSICAL EXAMINATION ON DISCHARGE:


VITAL SIGNS: Please see below.


General: Lying in bed,  AAOx3


HEENT: NC, AT, moist mucous membranes, anicteric eyes.


CVS: +S1S2 Regular, normal rate, No Rub/ murmur or gallop


Lungs: Fair air entry b/l, no appreciable wheezing, rhonchi or rales


Abdomen: Soft, nondistended, nontender, bowel sounds normal. 


Extremities: No evidence of edema, - Calf tenderness, right first digit dry 

gangrene surrounding erythema, warmth and odor 


Skin: sacrum/ coccyx stage 2 pressure ulcer with deep tissue injury in part of 

it 8 cm x 5 cm, stage 2 ulcer on the left buttock laterally with granulation 

tissues 5 cm x 4 cm was a blister before which has now broken,  and a small 

fluid filled blister on the left buttock above the stage 2 pressure injury.  





LABORATORY DATA: Please see below.





ACTIVITY: [As tolerated].





DIET: As tolerated





DISPOSITION: [facility].





DISCHARGE INSTRUCTIONS:


Follow up Dr Plaza in 1 week


Dr Stillerman in 1 week





DISCHARGE CONDITION: [Stable].





TIME SPENT ON DISCHARGE: 35 minutes.





Vital Signs/I&Os





Vital Signs








  Date Time  Temp Pulse Resp B/P (MAP) Pulse Ox O2 Delivery O2 Flow Rate FiO2


 


10/23/20 14:00 98.3 78 18 117/42 (67) 95 Room Air  














I&O- Last 24 Hours up to 6 AM 


 


 10/24/20





 06:00


 


Intake Total 480 ml


 


Output Total 675 ml


 


Balance -195 ml











Microbiology





Microbiology


10/16/20 Gram Stain - Final, Complete


           


10/16/20 Wound Culture - Final, Complete


           Staphylococcus Aureus


           Strep Agalactiae Group B


10/16/20 Anaerobic Culture - Final, Complete


           Anaerobic Cocci


10/15/20 Blood Culture - Final, Complete


           NO GROWTH AFTER 5 DAYS


10/15/20 Blood Culture - Final, Complete


           NO GROWTH AFTER 5 DAYS


10/14/20 Blood Culture - Final, Complete


           NO GROWTH AFTER 5 DAYS


10/14/20 Gram Stain - Final, Complete


           


10/14/20 Wound Culture - Final, Complete


           Staphylococcus Aureus


           Strep Agalactiae Group B


10/14/20 Blood Culture - Final, Complete


           Strep Agalactiae Group B





Discharge Medications


Scheduled


Amlodipine Besylate (Amlodipine Besylate) 5 Mg Tab, 5 MG PO DAILY, (Reported)


Carvedilol (Carvedilol) 3.125 Mg Tab, 3.125 MG PO BID, (Reported)


Cephalexin (Cephalexin) 500 Mg Capsule, 500 MG PO Q6H


Clopidogrel Bisulfate (Clopidogrel) 75 Mg Tablet, 75 MG PO DAILY, (Reported)


Furosemide (Furosemide) 40 Mg Tab, 40 MG PO DAILY, (Reported)


Glipizide (Glipizide Xl) 5 Mg Tab.er.24, 1 TAB PO DAILY


Levothyroxine Sodium (Synthroid) 50 Mcg Tablet, 50 MCG PO DAILY, (Reported)


Metronidazole (Flagyl) 500 Mg Tablet, 500 MG PO TID


Pravastatin Sodium (Pravachol) 20 Mg Tab, 20 MG PO QHS, (Reported)


Pregabalin (Lyrica) 100 Mg Cap, 100 MG PO TID, (Reported)


Rivaroxaban (Xarelto) 2.5 Mg Tablet, 2.5 MG PO BID, (Reported)


Sertraline Hcl (Sertraline HCl) 25 Mg Tablet, 1 TAB PO DAILY


Tamsulosin HCl (Flomax) 0.4 Mg Cap, 0.8 MG PO DAILY, (Reported)





Scheduled PRN


Acetaminophen (Acetaminophen) 325 Mg Tab, 650 MG PO Q6H PRN for PAIN, (Reported)


Hydrocodone/Acetaminophen (Hydrocodone-Acetamin 5-325 mg) 1 Each Tablet, 1 TAB 

PO TIDP PRN for SEVERE PAIN (PS 8-10)





Miscellaneous Medications


[Med Rec Comment]  , (Reported)


   LIST OBTAINED FROM PHARMACY, PT STATES HE TOOK ALL OF HIS MEDS THIS MORNING 





Allergies


Coded Allergies:  


     No Known Drug Allergies (Verified  Allergy, Unknown, 10/14/20)











KADEEM MCCORMACK MD                   Oct 24, 2020 12:52

## 2021-01-20 ENCOUNTER — HOSPITAL ENCOUNTER (OUTPATIENT)
Dept: HOSPITAL 53 - M LAB | Age: 86
End: 2021-01-20
Attending: SURGERY
Payer: MEDICARE

## 2021-01-20 DIAGNOSIS — D69.8: ICD-10-CM

## 2021-01-20 DIAGNOSIS — I70.212: Primary | ICD-10-CM

## 2021-01-20 LAB
APTT BLD: 39.7 SECONDS (ref 24.2–38.5)
BASOPHILS # BLD AUTO: 0.1 10^3/UL (ref 0–0.2)
BASOPHILS NFR BLD AUTO: 0.8 % (ref 0–1)
BUN SERPL-MCNC: 44 MG/DL (ref 7–18)
CALCIUM SERPL-MCNC: 9.6 MG/DL (ref 8.8–10.2)
CHLORIDE SERPL-SCNC: 102 MEQ/L (ref 98–107)
CO2 SERPL-SCNC: 32 MEQ/L (ref 21–32)
CREAT SERPL-MCNC: 0.88 MG/DL (ref 0.7–1.3)
EOSINOPHIL # BLD AUTO: 0.4 10^3/UL (ref 0–0.5)
EOSINOPHIL NFR BLD AUTO: 5.5 % (ref 0–3)
GFR SERPL CREATININE-BSD FRML MDRD: > 60 ML/MIN/{1.73_M2} (ref 35–?)
GLUCOSE SERPL-MCNC: 146 MG/DL (ref 70–100)
HCT VFR BLD AUTO: 34.6 % (ref 42–52)
HGB BLD-MCNC: 10.6 G/DL (ref 13.5–17.5)
INR PPP: 1.15
LYMPHOCYTES # BLD AUTO: 1.6 10^3/UL (ref 1.5–5)
LYMPHOCYTES NFR BLD AUTO: 20.6 % (ref 24–44)
MCH RBC QN AUTO: 24.9 PG (ref 27–33)
MCHC RBC AUTO-ENTMCNC: 30.6 G/DL (ref 32–36.5)
MCV RBC AUTO: 81.4 FL (ref 80–96)
MONOCYTES # BLD AUTO: 0.7 10^3/UL (ref 0–0.8)
MONOCYTES NFR BLD AUTO: 9.8 % (ref 0–5)
NEUTROPHILS # BLD AUTO: 4.8 10^3/UL (ref 1.5–8.5)
NEUTROPHILS NFR BLD AUTO: 63 % (ref 36–66)
PLATELET # BLD AUTO: 385 10^3/UL (ref 150–450)
POTASSIUM SERPL-SCNC: 4.1 MEQ/L (ref 3.5–5.1)
PROTHROMBIN TIME: 15 SECONDS (ref 12.5–14.3)
RBC # BLD AUTO: 4.25 10^6/UL (ref 4.3–6.1)
SODIUM SERPL-SCNC: 139 MEQ/L (ref 136–145)
WBC # BLD AUTO: 7.6 10^3/UL (ref 4–10)

## 2021-01-28 ENCOUNTER — HOSPITAL ENCOUNTER (OUTPATIENT)
Dept: HOSPITAL 53 - M LAB REF | Age: 86
End: 2021-01-28
Attending: NURSE PRACTITIONER
Payer: MEDICARE

## 2021-01-28 DIAGNOSIS — N39.0: Primary | ICD-10-CM

## 2021-05-27 ENCOUNTER — HOSPITAL ENCOUNTER (OUTPATIENT)
Dept: HOSPITAL 53 - M WUC | Age: 86
End: 2021-05-27
Attending: FAMILY MEDICINE
Payer: MEDICARE

## 2021-05-27 DIAGNOSIS — R05: Primary | ICD-10-CM

## 2021-05-27 NOTE — REP
INDICATION:

COUGH



COMPARISON:

10/14/2020



TECHNIQUE:

PA and lateral.



FINDINGS:

Lower lobe infiltrates along with moderate pleural effusions.  Cardiomegaly and

increased pulmonary vascular/interstitial markings.



IMPRESSION:

Differential diagnosis includes CHF as well as multifocal pneumonia.





<Electronically signed by Boby Santillan > 05/27/21 1110

## 2021-06-07 ENCOUNTER — HOSPITAL ENCOUNTER (INPATIENT)
Dept: HOSPITAL 53 - M ED | Age: 86
LOS: 17 days | Discharge: HOSPICE | DRG: 291 | End: 2021-06-24
Attending: GENERAL PRACTICE | Admitting: FAMILY MEDICINE
Payer: MEDICARE

## 2021-06-07 VITALS — BODY MASS INDEX: 22.17 KG/M2 | HEIGHT: 69 IN | WEIGHT: 149.69 LBS

## 2021-06-07 DIAGNOSIS — Z66: ICD-10-CM

## 2021-06-07 DIAGNOSIS — J96.02: ICD-10-CM

## 2021-06-07 DIAGNOSIS — Z20.822: ICD-10-CM

## 2021-06-07 DIAGNOSIS — G93.41: ICD-10-CM

## 2021-06-07 DIAGNOSIS — Z98.62: ICD-10-CM

## 2021-06-07 DIAGNOSIS — Z89.511: ICD-10-CM

## 2021-06-07 DIAGNOSIS — Z51.5: ICD-10-CM

## 2021-06-07 DIAGNOSIS — Z79.02: ICD-10-CM

## 2021-06-07 DIAGNOSIS — E03.9: ICD-10-CM

## 2021-06-07 DIAGNOSIS — I11.0: Primary | ICD-10-CM

## 2021-06-07 DIAGNOSIS — E11.42: ICD-10-CM

## 2021-06-07 DIAGNOSIS — N40.0: ICD-10-CM

## 2021-06-07 DIAGNOSIS — Z87.891: ICD-10-CM

## 2021-06-07 DIAGNOSIS — K21.9: ICD-10-CM

## 2021-06-07 DIAGNOSIS — Z88.8: ICD-10-CM

## 2021-06-07 DIAGNOSIS — Z79.899: ICD-10-CM

## 2021-06-07 DIAGNOSIS — J90: ICD-10-CM

## 2021-06-07 DIAGNOSIS — E11.52: ICD-10-CM

## 2021-06-07 DIAGNOSIS — I48.20: ICD-10-CM

## 2021-06-07 DIAGNOSIS — I50.43: ICD-10-CM

## 2021-06-07 DIAGNOSIS — Z79.01: ICD-10-CM

## 2021-06-08 VITALS — SYSTOLIC BLOOD PRESSURE: 129 MMHG | DIASTOLIC BLOOD PRESSURE: 63 MMHG

## 2021-06-08 VITALS — DIASTOLIC BLOOD PRESSURE: 67 MMHG | SYSTOLIC BLOOD PRESSURE: 148 MMHG

## 2021-06-08 VITALS — SYSTOLIC BLOOD PRESSURE: 142 MMHG | DIASTOLIC BLOOD PRESSURE: 62 MMHG

## 2021-06-08 VITALS — SYSTOLIC BLOOD PRESSURE: 109 MMHG | DIASTOLIC BLOOD PRESSURE: 56 MMHG

## 2021-06-08 VITALS — DIASTOLIC BLOOD PRESSURE: 60 MMHG | SYSTOLIC BLOOD PRESSURE: 134 MMHG

## 2021-06-08 VITALS — DIASTOLIC BLOOD PRESSURE: 60 MMHG | SYSTOLIC BLOOD PRESSURE: 136 MMHG

## 2021-06-08 VITALS — DIASTOLIC BLOOD PRESSURE: 61 MMHG | SYSTOLIC BLOOD PRESSURE: 131 MMHG

## 2021-06-08 VITALS — SYSTOLIC BLOOD PRESSURE: 144 MMHG | DIASTOLIC BLOOD PRESSURE: 65 MMHG

## 2021-06-08 VITALS — DIASTOLIC BLOOD PRESSURE: 56 MMHG | SYSTOLIC BLOOD PRESSURE: 118 MMHG

## 2021-06-08 VITALS — DIASTOLIC BLOOD PRESSURE: 57 MMHG | SYSTOLIC BLOOD PRESSURE: 117 MMHG

## 2021-06-08 VITALS — SYSTOLIC BLOOD PRESSURE: 143 MMHG | DIASTOLIC BLOOD PRESSURE: 66 MMHG

## 2021-06-08 VITALS — DIASTOLIC BLOOD PRESSURE: 59 MMHG | SYSTOLIC BLOOD PRESSURE: 138 MMHG

## 2021-06-08 VITALS — DIASTOLIC BLOOD PRESSURE: 62 MMHG | SYSTOLIC BLOOD PRESSURE: 132 MMHG

## 2021-06-08 VITALS — DIASTOLIC BLOOD PRESSURE: 63 MMHG | SYSTOLIC BLOOD PRESSURE: 137 MMHG

## 2021-06-08 VITALS — DIASTOLIC BLOOD PRESSURE: 63 MMHG | SYSTOLIC BLOOD PRESSURE: 136 MMHG

## 2021-06-08 VITALS — DIASTOLIC BLOOD PRESSURE: 61 MMHG | SYSTOLIC BLOOD PRESSURE: 127 MMHG

## 2021-06-08 VITALS — DIASTOLIC BLOOD PRESSURE: 60 MMHG | SYSTOLIC BLOOD PRESSURE: 128 MMHG

## 2021-06-08 VITALS — SYSTOLIC BLOOD PRESSURE: 123 MMHG | DIASTOLIC BLOOD PRESSURE: 60 MMHG

## 2021-06-08 VITALS — DIASTOLIC BLOOD PRESSURE: 62 MMHG | SYSTOLIC BLOOD PRESSURE: 130 MMHG

## 2021-06-08 VITALS — SYSTOLIC BLOOD PRESSURE: 139 MMHG | DIASTOLIC BLOOD PRESSURE: 61 MMHG

## 2021-06-08 VITALS — DIASTOLIC BLOOD PRESSURE: 71 MMHG | SYSTOLIC BLOOD PRESSURE: 152 MMHG

## 2021-06-08 VITALS — SYSTOLIC BLOOD PRESSURE: 143 MMHG | DIASTOLIC BLOOD PRESSURE: 65 MMHG

## 2021-06-08 VITALS — SYSTOLIC BLOOD PRESSURE: 150 MMHG | DIASTOLIC BLOOD PRESSURE: 67 MMHG

## 2021-06-08 VITALS — DIASTOLIC BLOOD PRESSURE: 56 MMHG | SYSTOLIC BLOOD PRESSURE: 128 MMHG

## 2021-06-08 VITALS — DIASTOLIC BLOOD PRESSURE: 60 MMHG | SYSTOLIC BLOOD PRESSURE: 132 MMHG

## 2021-06-08 VITALS — DIASTOLIC BLOOD PRESSURE: 67 MMHG | SYSTOLIC BLOOD PRESSURE: 142 MMHG

## 2021-06-08 LAB
ALBUMIN SERPL BCG-MCNC: 3 GM/DL (ref 3.2–5.2)
ALBUMIN SERPL BCG-MCNC: 3.1 GM/DL (ref 3.2–5.2)
ALT SERPL W P-5'-P-CCNC: 48 U/L (ref 12–78)
ALT SERPL W P-5'-P-CCNC: 51 U/L (ref 12–78)
AMYLASE FLD-CCNC: 9 U/L
APPEARANCE FLD: (no result)
BASE EXCESS BLDA CALC-SCNC: 9.2 MMOL/L (ref -2–2)
BASE EXCESS BLDA CALC-SCNC: 9.2 MMOL/L (ref -2–2)
BASE EXCESS BLDV CALC-SCNC: 1.9 MMOL/L (ref -2–2)
BASOPHILS # BLD AUTO: 0.1 10^3/UL (ref 0–0.2)
BASOPHILS NFR BLD AUTO: 0.6 % (ref 0–1)
BILIRUB CONJ SERPL-MCNC: 0.2 MG/DL (ref 0–0.2)
BILIRUB SERPL-MCNC: 0.3 MG/DL (ref 0.2–1)
BILIRUB SERPL-MCNC: 0.6 MG/DL (ref 0.2–1)
BUN SERPL-MCNC: 34 MG/DL (ref 7–18)
BUN SERPL-MCNC: 37 MG/DL (ref 7–18)
CALCIUM SERPL-MCNC: 8.2 MG/DL (ref 8.8–10.2)
CALCIUM SERPL-MCNC: 8.6 MG/DL (ref 8.8–10.2)
CHLORIDE SERPL-SCNC: 101 MEQ/L (ref 98–107)
CHLORIDE SERPL-SCNC: 98 MEQ/L (ref 98–107)
CHOLEST FLD-MCNC: < 50 MG/DL
CK MB CFR.DF SERPL CALC: 2.62
CK MB SERPL-MCNC: 1.1 NG/ML (ref ?–3.6)
CK SERPL-CCNC: 42 U/L (ref 39–308)
CO2 BLDA CALC-SCNC: 40.6 MEQ/L (ref 23–31)
CO2 BLDA CALC-SCNC: 42.9 MEQ/L (ref 23–31)
CO2 BLDV CALC-SCNC: 31 MEQ/L (ref 24–28)
CO2 SERPL-SCNC: 35 MEQ/L (ref 21–32)
CO2 SERPL-SCNC: 36 MEQ/L (ref 21–32)
COLOR PLR: YELLOW
CREAT SERPL-MCNC: 1.06 MG/DL (ref 0.7–1.3)
CREAT SERPL-MCNC: 1.1 MG/DL (ref 0.7–1.3)
EOSINOPHIL # BLD AUTO: 0.4 10^3/UL (ref 0–0.5)
EOSINOPHIL NFR BLD AUTO: 3.6 % (ref 0–3)
GFR SERPL CREATININE-BSD FRML MDRD: > 60 ML/MIN/{1.73_M2} (ref 35–?)
GFR SERPL CREATININE-BSD FRML MDRD: > 60 ML/MIN/{1.73_M2} (ref 35–?)
GLUCOSE SERPL-MCNC: 266 MG/DL (ref 70–100)
GLUCOSE SERPL-MCNC: 291 MG/DL (ref 70–100)
HCO3 BLDA-SCNC: 38.1 MEQ/L (ref 22–26)
HCO3 BLDA-SCNC: 39.8 MEQ/L (ref 22–26)
HCO3 BLDV-SCNC: 29.2 MEQ/L (ref 23–27)
HCO3 STD BLDA-SCNC: 32.9 MEQ/L (ref 22–26)
HCO3 STD BLDA-SCNC: 33 MEQ/L (ref 22–26)
HCO3 STD BLDV-SCNC: 25.7 MEQ/L
HCT VFR BLD AUTO: 32.4 % (ref 42–52)
HCT VFR BLD AUTO: 32.6 % (ref 42–52)
HGB BLD-MCNC: 9.3 G/DL (ref 13.5–17.5)
HGB BLD-MCNC: 9.5 G/DL (ref 13.5–17.5)
LDH FLD L TO P-CCNC: 69 U/L
LDH SERPL L TO P-CCNC: 181 U/L (ref 87–241)
LYMPHOCYTES # BLD AUTO: 1.1 10^3/UL (ref 1.5–5)
LYMPHOCYTES NFR BLD AUTO: 9.3 % (ref 24–44)
MCH RBC QN AUTO: 21.8 PG (ref 27–33)
MCH RBC QN AUTO: 21.9 PG (ref 27–33)
MCHC RBC AUTO-ENTMCNC: 28.7 G/DL (ref 32–36.5)
MCHC RBC AUTO-ENTMCNC: 29.1 G/DL (ref 32–36.5)
MCV RBC AUTO: 74.8 FL (ref 80–96)
MCV RBC AUTO: 76.4 FL (ref 80–96)
MONOCYTES # BLD AUTO: 1.2 10^3/UL (ref 0–0.8)
MONOCYTES NFR BLD AUTO: 10.6 % (ref 2–8)
NEUTROPHILS # BLD AUTO: 8.6 10^3/UL (ref 1.5–8.5)
NEUTROPHILS NFR BLD AUTO: 74.9 % (ref 36–66)
NT-PRO BNP: 3305 PG/ML (ref ?–450)
PCO2 BLDA: 81.3 MMHG (ref 35–45)
PCO2 BLDA: 99.5 MMHG (ref 35–45)
PCO2 BLDV: 60 MMHG (ref 38–50)
PH BLDA: 7.22 UNITS (ref 7.35–7.45)
PH BLDA: 7.29 UNITS (ref 7.35–7.45)
PH BLDV: 7.3 UNITS (ref 7.33–7.43)
PH FLD: 7.61 UNITS
PLATELET # BLD AUTO: 251 10^3/UL (ref 150–450)
PLATELET # BLD AUTO: 285 10^3/UL (ref 150–450)
PO2 BLDA: 83.2 MMHG (ref 75–100)
PO2 BLDA: 99.3 MMHG (ref 75–100)
PO2 BLDV: 43.8 MMHG (ref 30–50)
POTASSIUM SERPL-SCNC: 4 MEQ/L (ref 3.5–5.1)
POTASSIUM SERPL-SCNC: 4.3 MEQ/L (ref 3.5–5.1)
PROT SERPL-MCNC: 6.8 GM/DL (ref 6.4–8.2)
PROT SERPL-MCNC: 7.1 GM/DL (ref 6.4–8.2)
RBC # BLD AUTO: 4.24 10^6/UL (ref 4.3–6.1)
RBC # BLD AUTO: 4.36 10^6/UL (ref 4.3–6.1)
RSV RNA NPH QL NAA+PROBE: NEGATIVE
SAO2 % BLDA: 95.6 % (ref 95–99)
SAO2 % BLDA: 96.9 % (ref 95–99)
SAO2 % BLDV: 72.5 % (ref 60–80)
SODIUM SERPL-SCNC: 138 MEQ/L (ref 136–145)
SODIUM SERPL-SCNC: 139 MEQ/L (ref 136–145)
SPECIMEN SOURCE FLD: (no result)
TRIGL FLD-MCNC: < 2 MG/DL
TROPONIN I SERPL-MCNC: 0.02 NG/ML (ref ?–0.1)
TSH SERPL DL<=0.005 MIU/L-ACNC: 3.46 UIU/ML (ref 0.36–3.74)
WBC # BLD AUTO: 10 10^3/UL (ref 4–10)
WBC # BLD AUTO: 11.5 10^3/UL (ref 4–10)

## 2021-06-08 PROCEDURE — 0W9930Z DRAINAGE OF RIGHT PLEURAL CAVITY WITH DRAINAGE DEVICE, PERCUTANEOUS APPROACH: ICD-10-PCS | Performed by: THORACIC SURGERY (CARDIOTHORACIC VASCULAR SURGERY)

## 2021-06-08 RX ADMIN — LEVALBUTEROL HYDROCHLORIDE SCH MG: 1.25 SOLUTION, CONCENTRATE RESPIRATORY (INHALATION) at 20:53

## 2021-06-08 RX ADMIN — INSULIN LISPRO SCH UNITS: 100 INJECTION, SOLUTION INTRAVENOUS; SUBCUTANEOUS at 08:23

## 2021-06-08 RX ADMIN — KETOROLAC TROMETHAMINE SCH MG: 30 INJECTION, SOLUTION INTRAMUSCULAR at 22:32

## 2021-06-08 RX ADMIN — PREGABALIN SCH MG: 100 CAPSULE ORAL at 09:41

## 2021-06-08 RX ADMIN — INSULIN LISPRO SCH UNITS: 100 INJECTION, SOLUTION INTRAVENOUS; SUBCUTANEOUS at 13:19

## 2021-06-08 RX ADMIN — SODIUM CHLORIDE, PRESERVATIVE FREE SCH ML: 5 INJECTION INTRAVENOUS at 22:33

## 2021-06-08 RX ADMIN — DOCUSATE SODIUM SCH MG: 100 CAPSULE, LIQUID FILLED ORAL at 21:00

## 2021-06-08 RX ADMIN — INSULIN DETEMIR SCH UNITS: 100 INJECTION, SOLUTION SUBCUTANEOUS at 22:34

## 2021-06-08 RX ADMIN — SODIUM CHLORIDE SCH UNITS: 4.5 INJECTION, SOLUTION INTRAVENOUS at 22:34

## 2021-06-08 RX ADMIN — PRAVASTATIN SODIUM SCH MG: 20 TABLET ORAL at 21:00

## 2021-06-08 RX ADMIN — SODIUM CHLORIDE, PRESERVATIVE FREE SCH ML: 5 INJECTION INTRAVENOUS at 17:05

## 2021-06-08 RX ADMIN — DEXTROSE MONOHYDRATE SCH MG: 50 INJECTION, SOLUTION INTRAVENOUS at 09:44

## 2021-06-08 RX ADMIN — LEVOTHYROXINE SODIUM SCH MCG: 50 TABLET ORAL at 06:04

## 2021-06-08 RX ADMIN — INSULIN LISPRO SCH UNITS: 100 INJECTION, SOLUTION INTRAVENOUS; SUBCUTANEOUS at 21:00

## 2021-06-08 RX ADMIN — DEXTROSE MONOHYDRATE SCH MG: 50 INJECTION, SOLUTION INTRAVENOUS at 17:04

## 2021-06-08 RX ADMIN — INSULIN LISPRO SCH UNITS: 100 INJECTION, SOLUTION INTRAVENOUS; SUBCUTANEOUS at 17:04

## 2021-06-08 RX ADMIN — SODIUM CHLORIDE SCH UNITS: 4.5 INJECTION, SOLUTION INTRAVENOUS at 09:41

## 2021-06-08 RX ADMIN — DOCUSATE SODIUM SCH MG: 100 CAPSULE, LIQUID FILLED ORAL at 09:41

## 2021-06-08 RX ADMIN — TAMSULOSIN HYDROCHLORIDE SCH MG: 0.4 CAPSULE ORAL at 09:42

## 2021-06-08 RX ADMIN — PREGABALIN SCH MG: 100 CAPSULE ORAL at 16:00

## 2021-06-08 NOTE — REP
INDICATION:

s/p chest tube.



COMPARISON:

06/07/2021.



TECHNIQUE:

Single portable AP view of the chest was performed.



FINDINGS:

Right chest tube has been placed.  The right pleural effusion has resolved.  There is

no pneumothorax.  There are mild atelectatic changes in the right lung base.  Left

basilar parenchymal opacity is unchanged.  There is a small left effusion.  Heart and

mediastinum are unchanged.



IMPRESSION:

Placement of right chest tube.  Right pleural effusion has resolved.





<Electronically signed by Dalton Wright > 06/08/21 1911

## 2021-06-08 NOTE — REPVR
PROCEDURE INFORMATION: 

Exam: XR Chest 

Exam date and time: 6/7/2021 11:59 PM 

Age: 89 years old 

Clinical indication: Cough and dyspnea. 



TECHNIQUE: 

Imaging protocol: XR of the chest. 

Views: 1 view. 



COMPARISON: 

CR CHEST 2 VIEW 5/27/2021 1:18 PM 



FINDINGS: 

Lungs: There are bibasilar airspace opacities. 

Pleural spaces: There is a moderate to large right pleural effusion, which has 

increased in size compared to the prior chest x-ray on 05/27/2021. A small left 

pleural effusion is present as well. No pneumothorax is noted. 

Heart/Mediastinum: The cardiac silhouette is borderline in size. 

Vasculature: There are atherosclerotic calcifications of the aortic arch. 

Bones/joints: Unremarkable. 



IMPRESSION: 

1. Moderate to large right pleural effusion that has increased in size compared 

to the prior chest x-ray on 05/27/2021 and a persistent small left pleural 

effusion. 

2. Bibasilar airspace opacities, which may represent atelectasis and/or 

consolidation (e.g.: pneumonia). 



Electronically signed by: Yobani Rosales On 06/08/2021  00:44:05 AM

## 2021-06-08 NOTE — REPVR
PROCEDURE INFORMATION: 

Exam: CT Chest Without Contrast; Diagnostic 

Exam date and time: 6/8/2021 12:57 AM 

Age: 89 years old 

Clinical indication: Effusion, hypoxia, cough 



TECHNIQUE: 

Imaging protocol: Diagnostic computed tomography of the chest without contrast. 

3D rendering (Not supervised by radiologist): MIP and/or 3D reconstructed 

images were created by the technologist. 

Radiation optimization: All CT scans at this facility use at least one of these 

dose optimization techniques: automated exposure control; mA and/or kV 

adjustment per patient size (includes targeted exams where dose is matched to 

clinical indication); or iterative reconstruction. 



COMPARISON: 

CR PORTABLE CHEST X-RAY 6/7/2021 11:50 PM 



FINDINGS: 

Thyroid: Unremarkable. 

Lungs: There is atelectasis in the dependent portions of the upper lobes, 

atelectasis in the right middle lobe and inferior lingula, and compressive 

atelectasis in both lower lobes, which is responsible for the bibasilar 

airspace opacities seen in the chest x-ray on 06/07/2021. There are calcified 

granulomas in the right upper lobe. 

Pleural spaces: There is a large right pleural effusion and a small to moderate 

left pleural effusion measure water density. No pneumothorax. 

Heart: The heart is mildly enlarged. There are pericardial calcifications. No 

pericardial effusion is noted. There are coronary artery and aortic valve 

calcifications. 

Mediastinal space: No mediastinal mass, fluid collection, or pneumomediastinum. 

Aorta: No thoracic aortic aneurysm or intramural hematoma is noted. There are 

extensive atherosclerotic calcifications. 

Lymph nodes: No enlarged lymph nodes. 



Limited gallbladder: There are calcified gallstones in the gallbladder. The 

gallbladder was not fully imaged. 

Pancreas: Unremarkable. No dilation of the main pancreatic duct is noted. 

Adrenal glands: Normal. No adrenal mass is noted. 

Limited bowel: There is colonic diverticulosis involving the proximal 

descending colon. The bowel was not fully imaged. 

Bones/joints: There is no fracture or dislocation. No suspicious osteolytic or 

osteoblastic lesion. The bones have a demineralized appearance. There are 

bridging paraspinal osteophytes at multiple contiguous levels in the thoracic 

spine, which are findings compatible with diffuse idiopathic skeletal 

hyperostosis. There are Schmorl's nodes at several levels in the thoracic spine 

and lumbar spine. 

Soft tissues: There is edema in the subcutaneous tissues. 



IMPRESSION: 

1. Large right pleural effusion and a small to moderate left pleural effusion 

with associated atelectasis in both lungs. 

2. No CT findings to suggest pneumonia. 

3. Mild cardiomegaly and pericardial calcifications. 

4. Cholelithiasis. 

5. Colonic diverticulosis. 



Electronically signed by: Yobani Rosales On 06/08/2021  01:44:54 AM

## 2021-06-08 NOTE — IPNPDOC
Text Note


Date of Service


The patient was seen on 6/8/21.





NOTE


Subjective: Patient was alert and awake in the morning breathing on the room a

ir. Around 4 PM patient became more lethargic and somnolent. He was oriented in 

place but not in time.








Objective:


GENERAL APPEARANCE: Somnolent male


HEENT: no scleral icterus, plus JVD, EOMI


CARDIOVASCULAR: Irregularly irregular


LUNGS: Diminished more on the right side, bilateral crackles


ABDOMEN: soft & not tender w palpitation


MUSCULOSKELETAL: no cyanosis, no swelling, right below-knee amputation, left leg

edema +3, unstageable left heel wound


INTEGUMENT: no generalized pallor


NEUROLOGICAL: cranial nerve function from 2-12 intact intact





Assessment and plan


Patient  89-year-old male history of hypertension, diabetes, peripheral vascular

disease status post femoral-popliteal bypass, status post below knee right 

amputation, hypothyroidism, peripheral neuropathy, BPH, GERD, atrial 

fibrillation who presents with increased shortness of breath secondary to acute 

CHF





Acute hypercarbic  respiratory failure


Most likely secondary to large right pleural effusion due to acute CHF 

exacerbation


Appreciate/agree with thoracic surgeon consult, most likely patient will needs 

thoracocentesis. Thoracocentesis can be complicated by Plavix and xarelto, which

patient took yesterday


Patient was transferred to ICU, BiPAP started. ABG showed acute hypercapnic 

respiratory failure


Will continue to monitor ABG





Acute diastolic CHF


Last echo EF 60% last year


Echo ordered


I's and O's


Cardiac diet


Lasix IV, metolazone





Pneumonia rule out


Patient did not have fever, chills, cough, sputum , procalcitonin negative


I discontinued antibiotics





A. fib


Oral targeted anticoagulation on hold


Heart rate under control





Peripheral vascular diseases


Continue statin, Plavix and hold





Hypertension


Continue home meds





Type 2 diabetes


Continue insulin sliding scale


Diabetes diet


Detemir twice a day


Due to somnolence, Lyrica on hold





Hypothyroidism


Synthroid





BPH: Continue home Flomax





VS,Fishbone, I+O


VS, Fishbone, I+O


Laboratory Tests


6/7/21 23:58








6/8/21 06:57











Vital Signs








  Date Time  Temp Pulse Resp B/P (MAP) Pulse Ox O2 Delivery O2 Flow Rate FiO2


 


6/8/21 18:19        35


 


6/8/21 16:06 98.5 65 18 150/67 (94) 98 Nasal Cannula 2.0 














I&O- Last 24 Hours up to 6 AM 


 


 6/8/21





 06:00


 


Intake Total 150 ml


 


Output Total 725 ml


 


Balance -575 ml

















MOUNA EDWARDS DO             Jun 8, 2021 18:44

## 2021-06-08 NOTE — HPEPDOC
[facility] Medical History & Physical


Date of Admission


Jun 8, 2021


Date of Service:  Jun 8, 2021





History and Physical


CHIEF COMPLAINT: 


Shortness of breath





HISTORY OF PRESENT ILLNESS: 


89-year-old male history of hypertension, diabetes, peripheral vascular disease 

status post femoral-popliteal bypass, status post below knee right amputation, 

hypothyroidism, peripheral neuropathy, BPH, GERD, atrial fibrillation who 

presents because of a 3 week history of worsening shortness of breath. Patient 

tells me that over the past 3 weeks he has become increasingly short of breath 

he went to his primary care physician who prescribed a ten-day course of 

antibiotics which appeared to help at first but over the past 3-4 days he again 

started becoming short of breath his primary care physician increased his Lasix 

dose from 40 mg to 60 mg for 2 days which did not help. Patient now lives with 

his daughter due to his multiple comorbidities and she helps provide assistance.

Patient denies lower extremity edema but tells me that his daughter noticed his 

left leg has become more swollen. He tells me shortness of breath got better e

nough that he presented to the hospital. Upon presentation. Told patient was 

initially hypoxic requiring supplemental oxygen. Chest x-ray showed a pleural 

effusion moderate to large as well as possible bibasilar pneumonia. Patient 

denies any chest pain denies vision changes denies abdominal pain denies urinary

problems. Endorses poor vision due to macular degeneration. Denies a history of 

strokes or heart attacks in the past. Patient will be admitted to the medical 

service for further workup and management.





PAST MEDICAL/SURGICAL HISTORY:


hypertension, 


non-insulin-dependent diabetes, 


peripheral vascular disease status post femoral-popliteal bypass, 


status post below knee right amputation, 


hypothyroidism, 


peripheral neuropathy, 


BPH, 


GERD, 


atrial fibrillation





SOCIAL HISTORY:


Denies alcohol use 


Denies tobacco use tells me he quit many years ago can't tell me when 


Denies illicit drug use





FAMILY HISTORY:


Reviewed and none contributory to this admission 





ALLERGIES: Please see below.





REVIEW OF SYSTEMS:


10 point review of systems complete all negative otherwise stated in HPI





HOME MEDICATIONS: Please see below. 





PHYSICAL EXAMINATION:


Constitutional: Awake and alert, in no apparent distress


ENT: Sclera are clear. Mucosa is moist. 


Respiratory:  Lungs diminished breath sounds on the right side. Poor air entry 

globally.  No use of accessory muscles. Saturating 94% on supplemental oxygen


Cardiovascular: Regular heart rate without obvious murmurs.


Gastrointestinal: Abdomen is slightly distended but nontender, bowel sounds 

present


Musculoskeletal: Right below-knee amputation. Left leg 3+ edema extending 

upwards all the way to his sacrum


Neurologic: No focal neurological deficit.  


Mental Status: A&O x3, normal affect 


Skin: No visible rashes





LABORATORY DATA: See below.





IMAGING: 





Chest x-ray impressions:


IMPRESSION: 


1. Moderate to large right pleural effusion that has increased in size compared 


to the prior chest x-ray on 05/27/2021 and a persistent small left pleural 


effusion. 


2. Bibasilar airspace opacities, which may represent atelectasis and/or 


consolidation (e.g.: pneumonia). 





CT chest ordered in the ED pending read





MICROBIOLOGY: Please see below. 





ASSESSMENT/PLAN





89-year-old male history of hypertension, diabetes, peripheral vascular disease 

status post femoral-popliteal bypass, status post below knee right amputation, 

hypothyroidism, peripheral neuropathy, BPH, GERD, atrial fibrillation who 

presents because of a 3 week history of worsening shortness of breath found to 

be significantly fluid overloaded with right-sided pleural effusion and possible

bibasilar pneumonia versus atelectasis admit her for further medical workup and 

management





# Congestive heart failure exacerbation: presumably with preserved ejection 

fraction based on echo from October 2020. Clinically fluid overloaded. No 

documented history on last admission for CHF although he is on Lasix at home. 

Last echo EF 60% last year. Fu repeat echo. IV diuresis with Lasix. Monitor ins 

and outs.





# Right-sided pleural effusion: In the setting of CHF with fluid overload. CXR 

it is moderate to large. I placed a consultation for IR to drain it in the 

morning.





# Possible pneumonia: Chest x-ray suggestive of bibasilar opacities which could 

represent consolidations. Patient is afebrile with mild leukocytosis of 11.5 on 

admission. My suspicion for pneumonia is low. Follow-up CT chest results. I will

place him on antibiotics for now IV doxycycline and ceftriaxone. Follow-up blood

cultures. If the calcitonin is negative and CT does not suggest pneumonia 

antibodies can be discontinued. Incentive spirometry.





# Atrial fibrillation: Hold home anticoagulation with Xarelto tonight in 

anticipation for possible IR drainage of his right-sided pleural effusion. EKG 

in the ED shows junctional rhythm rate of 68. Continue carvedilol for rate 

control.





# PVD: Continue Plavix and statin


# Hypertension: Continue home meds. Monitor and titrate


# NIDDM with peripheral neuropathy: ISS. Frequent Accu-Cheks. Hypoglycemic 

precautions. Continue home Lyrica


# Hypothyroidism: resume Synthroid. 


# BPH: Continue home Flomax


# DVT prophylaxis: Heparin, can be switched back to Xarelto tomorrow after IR 

drains his pleural effusion





Spoke with patient and confirmed his CODE STATUS is DNR/DNI





A Yousef 


Hospitalist





Vital Signs





Vital Signs








  Date Time  Temp Pulse Resp B/P (MAP) Pulse Ox O2 Delivery O2 Flow Rate FiO2


 


6/8/21 01:45  71 20 153/70 (97) 95 Nasal Cannula 1.0 


 


6/7/21 23:13 97.4       











Laboratory Data


Labs 24H


Laboratory Tests 2


6/7/21 23:58: 


Immature Granulocyte % (Auto) 1.0, Neutrophils (%) (Auto) 74.9H, Lymphocytes (%)

(Auto) 9.3L, Monocytes (%) (Auto) 10.6H, Eosinophils (%) (Auto) 3.6H, Basophils 

(%) (Auto) 0.6, Neutrophils # (Auto) 8.6H, Lymphocytes # (Auto) 1.1L, Monocytes 

# (Auto) 1.2H, Eosinophils # (Auto) 0.4, Basophils # (Auto) 0.1, Nucleated Red 

Blood Cells % (auto) 0.0, Blood Gas Bicarbonate Standard 25.7, Venous Blood pH 

7.305L, Venous Blood Partial Pressure CO2 60.0H, Venous Blood Partial Pressure 

O2 43.8, Venous Blood Total Carbon Dioxide 31.0H, Venous Blood HCO3 29.2H, 

Venous Blood Oxygen Saturation 72.5, Venous Blood Base Excess 1.9, Anion Gap 4L,

Glomerular Filtration Rate > 60.0, Lactic Acid Level 1.2, Calcium Level 8.6L, 

Total Bilirubin 0.6, Direct Bilirubin 0.2, Aspartate Amino Transf (AST/SGOT) 14,

Alanine Aminotransferase (ALT/SGPT) 51, Alkaline Phosphatase 170H, Total 

Creatine Kinase 42, Creatine Kinase MB 1.1, Creatine Kinase MB Relative Index 

2.62, Troponin I 0.02, NT-Pro-B-Type Natriuretic Peptide 3305H, Total Protein 6.

8, Albumin 3.1L, Albumin/Globulin Ratio 0.8, Thyroid Stimulating Hormone (TSH) 

3.460, Coronavirus (COVID-19)(PCR) NEGATIVE, Influenza Type A (RT-PCR) NEGATIVE,

Influenza Type B (RT-PCR) NEGATIVE, Respiratory Syncytial Virus (PCR) NEGATIVE


CBC/BMP


Laboratory Tests


6/7/21 23:58








Microbiology





Microbiology


6/7/21 Blood Culture, Received


         Pending


6/7/21 Blood Culture, Received


         Pending





Home Medications


Scheduled


Amlodipine Besylate (Amlodipine Besylate) 5 Mg Tab, 5 MG PO DAILY


Carvedilol (Carvedilol) 3.125 Mg Tab, 3.125 MG PO BID


Clopidogrel Bisulfate (Clopidogrel) 75 Mg Tablet, 75 MG PO DAILY


Furosemide (Furosemide) 40 Mg Tab, 40 MG PO DAILY


Glipizide (Glipizide ER) 10 Mg Tab.er.24, 10 MG PO DAILY


Levothyroxine Sodium (Synthroid) 50 Mcg Tablet, 50 MCG PO DAILY


Pravastatin Sodium (Pravastatin Sodium) 20 Mg Tablet, 20 MG PO QPM


   TAKES AT DINNER 


Pregabalin (Lyrica) 100 Mg Cap, 100 MG PO TID


Rivaroxaban (Xarelto) 20 Mg Tablet, 20 MG PO QPM


   TAKES AT DINNER 


Tamsulosin HCl (Flomax) 0.4 Mg Cap, 0.8 MG PO DAILY


Vit A/Vit C/Vit E/Zinc/Copper (Icaps Areds Formula Dr Tablet) 1 Each Tablet., 

1 TAB PO BID





Allergies


Coded Allergies:  


     simvastatin (Verified  Allergy, Intermediate, unknown, 6/7/21)





A-FIB/CHADSVASC


A-FIB History


Current/History of A-Fib/PAF?:  Yes


Current PO Anticoag Therapy:  Yes











REJI SCHMIDT MD               Jun 8, 2021 02:20

## 2021-06-09 VITALS — SYSTOLIC BLOOD PRESSURE: 120 MMHG | DIASTOLIC BLOOD PRESSURE: 58 MMHG

## 2021-06-09 VITALS — DIASTOLIC BLOOD PRESSURE: 58 MMHG | SYSTOLIC BLOOD PRESSURE: 127 MMHG

## 2021-06-09 VITALS — SYSTOLIC BLOOD PRESSURE: 106 MMHG | DIASTOLIC BLOOD PRESSURE: 51 MMHG

## 2021-06-09 VITALS — DIASTOLIC BLOOD PRESSURE: 59 MMHG | SYSTOLIC BLOOD PRESSURE: 128 MMHG

## 2021-06-09 VITALS — SYSTOLIC BLOOD PRESSURE: 139 MMHG | DIASTOLIC BLOOD PRESSURE: 61 MMHG

## 2021-06-09 VITALS — DIASTOLIC BLOOD PRESSURE: 55 MMHG | SYSTOLIC BLOOD PRESSURE: 112 MMHG

## 2021-06-09 VITALS — SYSTOLIC BLOOD PRESSURE: 132 MMHG | DIASTOLIC BLOOD PRESSURE: 57 MMHG

## 2021-06-09 VITALS — SYSTOLIC BLOOD PRESSURE: 132 MMHG | DIASTOLIC BLOOD PRESSURE: 60 MMHG

## 2021-06-09 VITALS — SYSTOLIC BLOOD PRESSURE: 105 MMHG | DIASTOLIC BLOOD PRESSURE: 54 MMHG

## 2021-06-09 VITALS — SYSTOLIC BLOOD PRESSURE: 115 MMHG | DIASTOLIC BLOOD PRESSURE: 58 MMHG

## 2021-06-09 VITALS — DIASTOLIC BLOOD PRESSURE: 57 MMHG | SYSTOLIC BLOOD PRESSURE: 117 MMHG

## 2021-06-09 VITALS — DIASTOLIC BLOOD PRESSURE: 55 MMHG | SYSTOLIC BLOOD PRESSURE: 113 MMHG

## 2021-06-09 VITALS — SYSTOLIC BLOOD PRESSURE: 149 MMHG | DIASTOLIC BLOOD PRESSURE: 65 MMHG

## 2021-06-09 VITALS — SYSTOLIC BLOOD PRESSURE: 103 MMHG | DIASTOLIC BLOOD PRESSURE: 56 MMHG

## 2021-06-09 VITALS — SYSTOLIC BLOOD PRESSURE: 121 MMHG | DIASTOLIC BLOOD PRESSURE: 58 MMHG

## 2021-06-09 VITALS — SYSTOLIC BLOOD PRESSURE: 127 MMHG | DIASTOLIC BLOOD PRESSURE: 60 MMHG

## 2021-06-09 LAB
ALBUMIN SERPL BCG-MCNC: 2.7 GM/DL (ref 3.2–5.2)
ALT SERPL W P-5'-P-CCNC: 47 U/L (ref 12–78)
BASE EXCESS BLDA CALC-SCNC: 10.6 MMOL/L (ref -2–2)
BASE EXCESS BLDA CALC-SCNC: 10.7 MMOL/L (ref -2–2)
BASOPHILS # BLD AUTO: 0 10^3/UL (ref 0–0.2)
BASOPHILS NFR BLD AUTO: 0.4 % (ref 0–1)
BILIRUB SERPL-MCNC: 0.4 MG/DL (ref 0.2–1)
BUN SERPL-MCNC: 36 MG/DL (ref 7–18)
CALCIUM SERPL-MCNC: 8.3 MG/DL (ref 8.8–10.2)
CHLORIDE SERPL-SCNC: 102 MEQ/L (ref 98–107)
CO2 BLDA CALC-SCNC: 37 MEQ/L (ref 23–31)
CO2 BLDA CALC-SCNC: 42.9 MEQ/L (ref 23–31)
CO2 SERPL-SCNC: 39 MEQ/L (ref 21–32)
CREAT SERPL-MCNC: 1.07 MG/DL (ref 0.7–1.3)
EOSINOPHIL # BLD AUTO: 0.2 10^3/UL (ref 0–0.5)
EOSINOPHIL NFR BLD AUTO: 1.7 % (ref 0–3)
GFR SERPL CREATININE-BSD FRML MDRD: > 60 ML/MIN/{1.73_M2} (ref 35–?)
GLUCOSE SERPL-MCNC: 102 MG/DL (ref 70–100)
HCO3 BLDA-SCNC: 35.5 MEQ/L (ref 22–26)
HCO3 BLDA-SCNC: 40.1 MEQ/L (ref 22–26)
HCO3 STD BLDA-SCNC: 34.3 MEQ/L (ref 22–26)
HCO3 STD BLDA-SCNC: 34.4 MEQ/L (ref 22–26)
HCT VFR BLD AUTO: 32.8 % (ref 42–52)
HGB BLD-MCNC: 9.2 G/DL (ref 13.5–17.5)
INHALED O2 FLOW RATE: 21 L/MIN
LYMPHOCYTES # BLD AUTO: 0.8 10^3/UL (ref 1.5–5)
LYMPHOCYTES NFR BLD AUTO: 7.1 % (ref 24–44)
MAGNESIUM SERPL-MCNC: 2.1 MG/DL (ref 1.8–2.4)
MCH RBC QN AUTO: 21.9 PG (ref 27–33)
MCHC RBC AUTO-ENTMCNC: 28 G/DL (ref 32–36.5)
MCV RBC AUTO: 78.1 FL (ref 80–96)
MONOCYTES # BLD AUTO: 1.1 10^3/UL (ref 0–0.8)
MONOCYTES NFR BLD AUTO: 10 % (ref 2–8)
NEUTROPHILS # BLD AUTO: 8.8 10^3/UL (ref 1.5–8.5)
NEUTROPHILS NFR BLD AUTO: 80.4 % (ref 36–66)
PCO2 BLDA: 48.3 MMHG (ref 35–45)
PCO2 BLDA: 89.8 MMHG (ref 35–45)
PH BLDA: 7.27 UNITS (ref 7.35–7.45)
PH BLDA: 7.48 UNITS (ref 7.35–7.45)
PLATELET # BLD AUTO: 222 10^3/UL (ref 150–450)
PO2 BLDA: 110.9 MMHG (ref 75–100)
PO2 BLDA: 50 MMHG (ref 75–100)
POTASSIUM SERPL-SCNC: 3.9 MEQ/L (ref 3.5–5.1)
PROT SERPL-MCNC: 6.5 GM/DL (ref 6.4–8.2)
RBC # BLD AUTO: 4.2 10^6/UL (ref 4.3–6.1)
SAO2 % BLDA: 90.4 % (ref 95–99)
SAO2 % BLDA: 97.9 % (ref 95–99)
SODIUM SERPL-SCNC: 143 MEQ/L (ref 136–145)
WBC # BLD AUTO: 11 10^3/UL (ref 4–10)

## 2021-06-09 RX ADMIN — HYDROCODONE BITARTRATE AND ACETAMINOPHEN PRN TAB: 5; 325 TABLET ORAL at 15:49

## 2021-06-09 RX ADMIN — INSULIN DETEMIR SCH UNITS: 100 INJECTION, SOLUTION SUBCUTANEOUS at 20:15

## 2021-06-09 RX ADMIN — SODIUM CHLORIDE, PRESERVATIVE FREE SCH ML: 5 INJECTION INTRAVENOUS at 05:50

## 2021-06-09 RX ADMIN — DEXTROSE MONOHYDRATE SCH MG: 50 INJECTION, SOLUTION INTRAVENOUS at 02:24

## 2021-06-09 RX ADMIN — SODIUM CHLORIDE SCH UNITS: 4.5 INJECTION, SOLUTION INTRAVENOUS at 10:46

## 2021-06-09 RX ADMIN — INSULIN LISPRO SCH UNITS: 100 INJECTION, SOLUTION INTRAVENOUS; SUBCUTANEOUS at 20:15

## 2021-06-09 RX ADMIN — DOCUSATE SODIUM SCH MG: 100 CAPSULE, LIQUID FILLED ORAL at 20:16

## 2021-06-09 RX ADMIN — DOCUSATE SODIUM SCH MG: 100 CAPSULE, LIQUID FILLED ORAL at 10:48

## 2021-06-09 RX ADMIN — DEXTROSE MONOHYDRATE SCH MG: 50 INJECTION, SOLUTION INTRAVENOUS at 10:46

## 2021-06-09 RX ADMIN — TAMSULOSIN HYDROCHLORIDE SCH MG: 0.4 CAPSULE ORAL at 10:48

## 2021-06-09 RX ADMIN — INSULIN LISPRO SCH UNITS: 100 INJECTION, SOLUTION INTRAVENOUS; SUBCUTANEOUS at 17:30

## 2021-06-09 RX ADMIN — INSULIN LISPRO SCH UNITS: 100 INJECTION, SOLUTION INTRAVENOUS; SUBCUTANEOUS at 07:30

## 2021-06-09 RX ADMIN — KETOROLAC TROMETHAMINE SCH MG: 30 INJECTION, SOLUTION INTRAMUSCULAR at 02:24

## 2021-06-09 RX ADMIN — DEXTROSE MONOHYDRATE SCH MG: 50 INJECTION, SOLUTION INTRAVENOUS at 21:26

## 2021-06-09 RX ADMIN — LEVALBUTEROL HYDROCHLORIDE SCH MG: 1.25 SOLUTION, CONCENTRATE RESPIRATORY (INHALATION) at 13:02

## 2021-06-09 RX ADMIN — INSULIN DETEMIR SCH UNITS: 100 INJECTION, SOLUTION SUBCUTANEOUS at 09:00

## 2021-06-09 RX ADMIN — INSULIN LISPRO SCH UNITS: 100 INJECTION, SOLUTION INTRAVENOUS; SUBCUTANEOUS at 12:00

## 2021-06-09 RX ADMIN — DEXTROSE MONOHYDRATE SCH MG: 50 INJECTION, SOLUTION INTRAVENOUS at 15:49

## 2021-06-09 RX ADMIN — LEVALBUTEROL HYDROCHLORIDE SCH MG: 1.25 SOLUTION, CONCENTRATE RESPIRATORY (INHALATION) at 02:30

## 2021-06-09 RX ADMIN — LEVALBUTEROL HYDROCHLORIDE SCH MG: 1.25 SOLUTION, CONCENTRATE RESPIRATORY (INHALATION) at 19:46

## 2021-06-09 RX ADMIN — SODIUM CHLORIDE, PRESERVATIVE FREE SCH ML: 5 INJECTION INTRAVENOUS at 14:10

## 2021-06-09 RX ADMIN — PRAVASTATIN SODIUM SCH MG: 20 TABLET ORAL at 20:15

## 2021-06-09 RX ADMIN — LEVALBUTEROL HYDROCHLORIDE SCH MG: 1.25 SOLUTION, CONCENTRATE RESPIRATORY (INHALATION) at 07:28

## 2021-06-09 RX ADMIN — SODIUM CHLORIDE SCH UNITS: 4.5 INJECTION, SOLUTION INTRAVENOUS at 20:16

## 2021-06-09 RX ADMIN — PANTOPRAZOLE SODIUM SCH MG: 40 TABLET, DELAYED RELEASE ORAL at 10:48

## 2021-06-09 NOTE — REP
INDICATION:

pe



COMPARISON:

None.



TECHNIQUE:

Real time compression and duplex Doppler interrogation of the bilateral lower

extremity deep venous system is performed.  Compression ultrasound is performed of the

bilateral peroneal and posterior tibial veins.  The right peroneal vein is not

visualized.



FINDINGS:

Bilaterally, the common femoral, superficial femoral and popliteal veins are fully

compressible with transducer pressure and demonstrate normal spontaneous and phasic

flow, without evidence of deep venous thrombosis.  No thrombus is seen in the

bilateral visualized portions of the peroneal and posterior tibial veins.



IMPRESSION:

No evidence of deep venous thrombosis of the bilateral lower extremity femoral

popliteal venous system.





<Electronically signed by Dalton Wright > 06/09/21 1921

## 2021-06-09 NOTE — REP
INDICATION:

pleural effusion



COMPARISON:

06/08/2021



TECHNIQUE:

Portable AP view of the chest



FINDINGS:

Right-sided chest tube is in stable position and there appears to be increased right

apical pneumothorax and increased opacity in the right mid to lower lung zone.  Left

lower lobe/retrocardiac opacities remain essentially unchanged.



IMPRESSION:

1.  Small but increased right apical pneumothorax and increased right lower lobe

opacities.

2.  Stable left lower lobe/retrocardiac opacities.





<Electronically signed by Boby Santillan > 06/09/21 0846

## 2021-06-09 NOTE — IPN
PROGRESS NOTE



DATE:  06/09/2021



SUBJECTIVE: Mr. Burdick is much more awake today. e isHe is

He is able to answer questions and follow commands. He is still on a BiPAP. 



OBJECTIVE: 

VITAL SIGNS: Show a T-max of 97.6 with a heart rate that ranges between 63 and

68 in atrial fibrillation with a respiratory rate of 24 to 44 without the use

of accessory muscles on BiPAP. He is 92% to 98% saturated on 35% FiO2 and whose

blood pressure is ranging between 139/61 to 112/55. 

INTAKE AND OUTPUT: Over the past 24 hours has been recorded as 150 in and 4485

out for a negativity of 4300 mL. Almost all of that has been from chest tube

output. He did have 1375 mL in urine. He weighs 81.7 kg today compared to 83 kg

yesterday. There is no air leak.

RESPIRATORY: He has equal breath sounds on either side with inspiratory rales.

Percussion notes are full to the diaphragm. 

CARDIAC: Without murmurs, clicks, gallops, or rubs. I cannot feel his PMI.

Heart sounds are quite distant.  

ABDOMEN: Soft and nontender. Bowel sounds are hypoactive, but present. 

EXTREMITIES: Show his remaining left leg shows no pretibial edema with a

gangrenous right great toe. 

SKIN: Warm, dry, and perfused without cyanosis or mottling, including that of

the nail beds and knees. 

NECK: Supple. There is no jugular venous distention. No subcutaneous emphysema.

Trachea is midline.

MOUTH: Not examined secondary to the BiPAP. 

EYES: Show his pupils equal and reactive. Extraocular muscles are intact.

Sclerae nonicteric. 

NEUROLOGIC: Shows the patient to have a generalized tremor reminiscent of

Parkinson's. He does move all three extremities.



IMAGING DATA: His chest x-ray is vastly improved over yesterday's prior to his

chest tube insertion. He has done poorly as he is still dependent on the BiPAP.

There looks to be some post-expansion pulmonary edema in the right lower lobe.

This may be an infiltrative process, but I suspect it is more likely expansion

pulmonary edema. Costophrenic angles are not sharp. He has an increased CT

ratio, but the heart does not look lobular. 



IMPRESSION:

1.  Large pleural effusion.

2.  Possible congestive heart failure.

3.  Diabetes. 

4.  Hypertension.

5.  Hypothyroidism. 

6.  Atrial fibrillation. 

7.  Peripheral vascular disease status post femoral popliteal bypass with a

    right knee amputation. 



PLAN AND DISCUSSION: I would recommend more aggressive diuresis as he is only

on 60 mg q. 8 hours of Lasix. We will await the results of the echo. His TSH

was normal and I cannot assign his pleural effusion to hypothyroid disease.

While his albumin was low of only 3, it was not extraordinarily low that the

hypoalbuminemia would explain his pleural effusion. We are therefore left with

a beta natriuretic protein of 3300. We will await the echo to absolutely assign

this to congestive failure. 



It has been noted that his pleural fluid was returned with a pH of 7.6 and an

LDH of 69 with a corresponding LDH of 181 with 569 white cells, 78% was

mononuclear lymphocytes and 21% were PMNs. Glucose was 270. This therefore

looks like a transudative monocytic lymphocytic pleural effusion consistent

with both chronicity and congestive heart failure.

## 2021-06-09 NOTE — RO
OPERATIVE NOTE



DATE OF OPERATION: 06/08/2021



PREPROCEDURE DIAGNOSES: 

1.  Large right-sided pleural effusion.

2.  Hypercarbia.

3.  Somnolence. 



POSTPROCEDURE DIAGNOSES:

1.  Large right-sided pleural effusion.

2.  Hypercarbia.

3.  Somnolence. 



PROCEDURE: Insertion of a right lateral chest tube. 



SURGEON: Elder Hu M.D. 



ASSISTANT: None. 



CONSENT: The patient was not able to give consent as he was barely conscious.

Family could not be contacted. Therefore, the procedure is done as an emergency

procedure. 



NOTE: The patient is on Plavix and Eliquis. By report x-ray would not undertake

a catheter thoracentesis. I was then asked to drain him emergently. 



ANESTHESIA: Moderate sedate 2 mg Versed. 



DESCRIPTION OF PROCEDURE: Under satisfactory moderate sedation achieved with 2

mg of Versed, the patient was prepped and draped in the usual sterile fashion.

Incision was made at the approximate sixth intercostal space. The tunnel was

created in the chest over the rib with particular care to stay right on and

above the seventh rib. A #24 chest tube was placed without difficulty and

secured to the chest wall with a #2 Tevdek suture. Chest drained 3000 mL of

serous fluid. It was sent for the requisite studies of chemistries,

hematologies, cytologies, and bacteriologies. The patient tolerated the

procedure well and a chest x-ray is pending.

## 2021-06-09 NOTE — IPNPDOC
Text Note


Date of Service


The patient was seen on 6/9/21.





NOTE


Subjective: Patient was somnolent in the morning, blood gas showed acute hyper

carbic respiratory failure with pH 7.2, BiPAP was adjusted, subsequently 

patient's mental status significantly improved.








Objective:


GENERAL APPEARANCE: Somnolent male


HEENT: no scleral icterus, plus JVD, EOMI


CARDIOVASCULAR: Irregularly irregular


LUNGS: Diminished more on the right side, bilateral crackles, chest tube in 

place


ABDOMEN: soft & not tender w palpitation


MUSCULOSKELETAL: no cyanosis, right below-knee amputation, left leg edema +3, 

unstageable left heel wound


INTEGUMENT: no generalized pallor


NEUROLOGICAL: cranial nerve function from 2-12 intact intact








Assessment and plan


Patient  89-year-old male history of hypertension, diabetes, peripheral vascular

disease status post femoral-popliteal bypass, status post below knee right 

amputation, hypothyroidism, peripheral neuropathy, BPH, GERD, atrial 

fibrillation who presents with increased shortness of breath secondary to acute 

CHF





Metabolic encephalopathy


Secondary to CO2 narcosis


Will adjust BiPAP











Acute hypercarbic  respiratory failure


Most likely secondary to large right pleural effusion due to acute CHF 

exacerbation


Thoracocentesis was done yesterday, 3 L of transudative fluid was evacuated


Continue to monitor blood gas


Continuing BiPAP


X-ray showed Small but increased right apical pneumothorax and increased right 

lower lobe


opacities.





Acute combined systolic and diastolic CHF


Echo showed Normal LV size with global left ventricle systolic dysfunction more


    pronounced in the distal septum and apex and overall severe left ventricle


    systolic dysfunction and estimated EF 25 to 30%. Grade 2 diastolic


    dysfunction.  


I's and O's


Cardiac diet


We intensified diuresis with Lasix IV, metolazone. I will ask cardiologist to 

follow the patient after initial stabilization





Pneumonia rule out


Patient did not have fever, chills, cough, sputum , procalcitonin negative


I discontinued antibiotics





A. fib


Oral targeted anticoagulation on hold. I will restart anticoagulation after 

chest tube removal


Heart rate under control





Peripheral vascular diseases


Continue statin, Plavix and hold





Hypertension


Continue home meds





Type 2 diabetes


Continue insulin sliding scale


Diabetes diet


Detemir twice a day


Lyrica on hold





Hypothyroidism


Synthroid





BPH: Continue home Flomax





VS,Fishbone, I+O


VS, Fishbone, I+O


Laboratory Tests


6/9/21 05:11











Vital Signs








  Date Time  Temp Pulse Resp B/P (MAP) Pulse Ox O2 Delivery O2 Flow Rate FiO2


 


6/9/21 15:49   20   Nasal Cannula 2.0 


 


6/9/21 14:00  67   96   


 


6/9/21 12:00 97.8   132/60 (84)    


 


6/9/21 09:00        21














I&O- Last 24 Hours up to 6 AM 


 


 6/9/21





 06:00


 


Intake Total 0 ml


 


Output Total 4775 ml


 


Balance -4775 ml

















MOUNA EDWARDS DO             Jun 9, 2021 16:41

## 2021-06-09 NOTE — CR
CONSULTATION

DATE: 06/08/2021



REASON FOR CONSULTATION:  The patient is seen at the request of the hospitalist

service, Dr. Webber, for a pleural effusion. 



HISTORY OF PRESENT ILLNESS:  The patient is an 89-year-old white male who at

the time of my seeing him is essentially unconscious with CO2 narcosis and I

cannot get a history from him. He was admitted this morning with increasing

shortness of breath over the past three weeks. He was found to have a large

pleural effusion. He has a history of hypertension, peripheral vascular

disease, and atrial fibrillation. He was found to have a very high beta

natriuretic hormone and he is presumed to be in congestive heart failure. He

was sent for a thoracentesis to radiology. He was on Plavix and Xarelto at

home, and radiology deferred his thoracentesis suggesting I be contacted. 



PAST MEDICAL HISTORY:

1.  Hypertension. 

2.  Non-insulin-dependent diabetes.

3.  Peripheral vascular disease status post femoral popliteal bypass. 

4.  Status post right below-the-knee amputation.

5.  Hypothyroidism. 

6.  Peripheral neuropathy.

7.  BPH. 

8.  GERD. 

9.  Atrial fibrillation.



SOCIAL HISTORY: Quit smoking many years ago. Denies illicit drug use or alcohol

use to the hospitalist service when he was more alert.



ALLERGIES: SIMVASTATIN. 



TRAVEL HISTORY: Not obtained.



OCCUPATIONAL HISTORY: Not obtained.



EXPOSURES: Not obtained.



REVIEW OF SYSTEMS: Not obtained secondary to patient's obtundation. 



PHYSICAL EXAMINATION:

GENERAL APPEARANCE: The patient is an 89-year-old male somnolence, on BiPAP,

and unable to communicate.

VITAL SIGNS: Temperature 98.5 with a heart rate of 65 in atrial fibrillation

with a respiratory rate of 27 on BiPAP with FiO2 of 35% with O2 saturation of

98%. Blood pressure is 150/67. 

EYES: Pupils equal, round, reactive to light. Extraocular movements intact.

Sclerae nonicteric. 

NOSE: Without deformity. 

MOUTH: Shows the mucous membranes to be pink and moist. Lips and commisures are

without lesions. There is no thrush.

HEAD: Normocephalic. 

NECK: Supple. There is no jugular venous distention. No subcutaneous emphysema.

Trachea is midline. 

LUNGS: Show markedly decreased breath sounds on the right side. Percussion note

is dull on the right side. 

CARDIAC: Shows an irregular rate and rhythm. I do not appreciate murmurs,

clicks, gallops, or rubs. 

ABDOMEN: Soft and nontender. Bowel sounds are hypoactive, but present. 

EXTREMITIES: Show no pretibial edema on the left leg that is remaining. He does

have a gangrenous great toe. There is no differential swelling of the upper

extremities. 

SKIN: Warm, dry, and perfuse without cyanosis or mottling. 

NEUROLOGIC: Shows the patient to be somnolent, but looks to be moving all three

extremities. 

PSYCHIATRIC: Shows the patient to be somnolent and barely responsive. 



LABORATORY DATA: His white count is 10.0 with hemoglobin and hematocrit of 9.3

and 32.4 respectively. Platelet count is 251,000. Differential yesterday showed

74% neutrophils, 9% lymphocytes, and 10% monocytes. There were no immature

forms and no toxic granulations reported. 



Chemistries showed a total CO2 of 35 with the remainder of electrolytes normal.

BUN and creatinine were 24 and 1.06 with a glucose of 266 and a calcium of 8.2

with a corresponding albumin of 3.0. AST and ALT are normal. There is no PT/INR

on him. His D-dimer is 3001. Beta natriuretic peptide is 3300. 



His blood gases show a pH of 7.22 with a pCO2 of 99 and pO2 also of 99 with

base excess of 9.4. 



An EKG is not on the chart.



IMAGING DATA: His chest x-ray shows a right lower hemithorax opacity. CT

confirms a large right pleural effusion. CT is done without contrast. He has a

compressed lower lobe from the pleural effusion. He has minimal, but scattered

mediastinal lymphadenopathy. His lung windows do not show any masses. There is

fluid in the fissure. The adrenals have a normal configuration. 



IMPRESSION:

1.  Large right pleural effusion. 

2.  Respiratory failure.

3.  Hypercarbia. 

4.  Possible congestive heart failure (CHF) as an etiology of his pleural

    effusion. 

5.  Severe peripheral vascular disease. 

6.  Necrotic right toe.

7.  CO2 narcosis. 

8.  Diabetes.

9.  Hypertension.

10.  Hypothyroidism. 

11.  Gastroesophageal reflux disease (GERD). 

12.  Atrial fibrillation. 



PLAN AND DISCUSSION: I will place a chest tube. He is at increased risk for

bleeding, but I will stay right over the top of a rib in order to avoid the

intercostal vessels. I suspect there is at least 2 liters in his chest. We will

send it for all of the requisite studies to include cytologies, hematologies,

chemistries, and bacteriologies. I will order an echo after I place the chest

tube to see if this is systolic heart failure. He does need very aggressive

diuresis and I will leave that to the medical service. He may get worse before

he gets better with postexpansion pulmonary edema and he may need to be

intubated with full mechanical ventilation. We will watch him closely over the

next 48 hours.

## 2021-06-09 NOTE — ECHO
ECHOCARDIOGRAM



DATE OF PROCEDURE: 06/08/2021



Age: 

Gender: 

Height: 175 cm

Weight: 79 kg



REFERRING PHYSICIAN: Yuriy Holden MD.



INDICATION: Dyspnea. 



MEASUREMENTS:

     IVS 1.0 cm

     LV 4.0 cm

     LVPW 0.9 cm

     LA 4.0 cm

     Aorta 3.5 cm 

     IVC 1.9 cm

     Mitral E wave velocity 110

     A wave 37

     E prime septal 6.2

     E prime lateral 5.8



FINDINGS: 

This study is of acceptable technical quality. Underlying sinus rhythm.



Left ventricle is of normal size. There is severe global hypokinesis that is

especially pronounced in distal septum and apex. I estimated overall EF in the

neighborhood of 25 to 30%. Right ventricle is normal size and grossly appears

to have preserved systolic function. There is at least mild biatrial

enlargement. Aortic valve is mildly sclerotic, but mobility of cusps is

preserved. There are also mild degenerative abnormalities of mitral valve with

mitral annular calcifications. Tricuspid and pulmonic valves appear normal. No

pericardial effusion is noted. Inferior vena cava is dilated, but there is

partial collapse with inspiration indicative of likely mildly elevated central

venous pressure. Aortic root is normal. Aortic arch and abdominal aorta were

not well seen.



Doppler interrogation of aortic valve reveals no significant stenosis or

insufficiency. There is mild mitral and mild tricuspid insufficiency.

Calculated pulmonary artery pressure in the 60s corresponding to moderately

severe pulmonary hypertension. 



Mitral inflow pattern and tissue Doppler imaging of mitral annulus revealed

grade 2 diastolic dysfunction indicative of high left ventricular end-diastolic

pressure. 



CONCLUSIONS:

1.  Study is of acceptable technical quality, underlying sinus rhythm.

2.  Normal LV size with global left ventricular systolic dysfunction more

    pronounced in the distal septum and apex and overall estimated 

    LV EF 25 to 30%. Grade 2 diastolic dysfunction.  

3.  No hemodynamically significant valvular disease.

4.  High central venous pressure and likely moderately severe pulmonary

    hypertension. 

[facility]

## 2021-06-10 VITALS — DIASTOLIC BLOOD PRESSURE: 59 MMHG | SYSTOLIC BLOOD PRESSURE: 129 MMHG

## 2021-06-10 VITALS — SYSTOLIC BLOOD PRESSURE: 125 MMHG | DIASTOLIC BLOOD PRESSURE: 56 MMHG

## 2021-06-10 VITALS — SYSTOLIC BLOOD PRESSURE: 124 MMHG | DIASTOLIC BLOOD PRESSURE: 56 MMHG

## 2021-06-10 VITALS — SYSTOLIC BLOOD PRESSURE: 112 MMHG | DIASTOLIC BLOOD PRESSURE: 52 MMHG

## 2021-06-10 VITALS — SYSTOLIC BLOOD PRESSURE: 128 MMHG | DIASTOLIC BLOOD PRESSURE: 61 MMHG

## 2021-06-10 VITALS — DIASTOLIC BLOOD PRESSURE: 64 MMHG | SYSTOLIC BLOOD PRESSURE: 149 MMHG

## 2021-06-10 LAB
BASOPHILS # BLD AUTO: 0 10^3/UL (ref 0–0.2)
BASOPHILS NFR BLD AUTO: 0.4 % (ref 0–1)
BUN SERPL-MCNC: 44 MG/DL (ref 7–18)
CALCIUM SERPL-MCNC: 8.1 MG/DL (ref 8.8–10.2)
CHLORIDE SERPL-SCNC: 95 MEQ/L (ref 98–107)
CO2 SERPL-SCNC: 40 MEQ/L (ref 21–32)
CREAT SERPL-MCNC: 1.39 MG/DL (ref 0.7–1.3)
EOSINOPHIL # BLD AUTO: 0.4 10^3/UL (ref 0–0.5)
EOSINOPHIL NFR BLD AUTO: 3.7 % (ref 0–3)
GFR SERPL CREATININE-BSD FRML MDRD: 51.2 ML/MIN/{1.73_M2} (ref 35–?)
GLUCOSE SERPL-MCNC: 134 MG/DL (ref 70–100)
HCT VFR BLD AUTO: 30 % (ref 42–52)
HGB BLD-MCNC: 9 G/DL (ref 13.5–17.5)
LYMPHOCYTES # BLD AUTO: 1.5 10^3/UL (ref 1.5–5)
LYMPHOCYTES NFR BLD AUTO: 13.9 % (ref 24–44)
MCH RBC QN AUTO: 21.6 PG (ref 27–33)
MCHC RBC AUTO-ENTMCNC: 30 G/DL (ref 32–36.5)
MCV RBC AUTO: 72.1 FL (ref 80–96)
MONOCYTES # BLD AUTO: 1.3 10^3/UL (ref 0–0.8)
MONOCYTES NFR BLD AUTO: 11.6 % (ref 2–8)
NEUTROPHILS # BLD AUTO: 7.6 10^3/UL (ref 1.5–8.5)
NEUTROPHILS NFR BLD AUTO: 70.1 % (ref 36–66)
PLATELET # BLD AUTO: 250 10^3/UL (ref 150–450)
POTASSIUM SERPL-SCNC: 3.3 MEQ/L (ref 3.5–5.1)
RBC # BLD AUTO: 4.16 10^6/UL (ref 4.3–6.1)
SODIUM SERPL-SCNC: 141 MEQ/L (ref 136–145)
WBC # BLD AUTO: 10.8 10^3/UL (ref 4–10)

## 2021-06-10 RX ADMIN — LEVALBUTEROL HYDROCHLORIDE SCH MG: 1.25 SOLUTION, CONCENTRATE RESPIRATORY (INHALATION) at 20:32

## 2021-06-10 RX ADMIN — PRAVASTATIN SODIUM SCH MG: 20 TABLET ORAL at 20:13

## 2021-06-10 RX ADMIN — INSULIN DETEMIR SCH UNITS: 100 INJECTION, SOLUTION SUBCUTANEOUS at 20:14

## 2021-06-10 RX ADMIN — INSULIN DETEMIR SCH UNITS: 100 INJECTION, SOLUTION SUBCUTANEOUS at 08:43

## 2021-06-10 RX ADMIN — INSULIN LISPRO SCH UNITS: 100 INJECTION, SOLUTION INTRAVENOUS; SUBCUTANEOUS at 17:23

## 2021-06-10 RX ADMIN — PANTOPRAZOLE SODIUM SCH MG: 40 TABLET, DELAYED RELEASE ORAL at 08:42

## 2021-06-10 RX ADMIN — INSULIN LISPRO SCH UNITS: 100 INJECTION, SOLUTION INTRAVENOUS; SUBCUTANEOUS at 12:20

## 2021-06-10 RX ADMIN — DEXTROSE MONOHYDRATE SCH MG: 50 INJECTION, SOLUTION INTRAVENOUS at 17:22

## 2021-06-10 RX ADMIN — DEXTROSE MONOHYDRATE SCH MG: 50 INJECTION, SOLUTION INTRAVENOUS at 22:20

## 2021-06-10 RX ADMIN — SODIUM CHLORIDE SCH UNITS: 4.5 INJECTION, SOLUTION INTRAVENOUS at 20:14

## 2021-06-10 RX ADMIN — LEVOTHYROXINE SODIUM SCH MCG: 50 TABLET ORAL at 06:13

## 2021-06-10 RX ADMIN — SODIUM CHLORIDE SCH UNITS: 4.5 INJECTION, SOLUTION INTRAVENOUS at 08:44

## 2021-06-10 RX ADMIN — DOCUSATE SODIUM SCH MG: 100 CAPSULE, LIQUID FILLED ORAL at 20:12

## 2021-06-10 RX ADMIN — INSULIN LISPRO SCH UNITS: 100 INJECTION, SOLUTION INTRAVENOUS; SUBCUTANEOUS at 08:40

## 2021-06-10 RX ADMIN — DEXTROSE MONOHYDRATE SCH MG: 50 INJECTION, SOLUTION INTRAVENOUS at 04:18

## 2021-06-10 RX ADMIN — LEVALBUTEROL HYDROCHLORIDE SCH MG: 1.25 SOLUTION, CONCENTRATE RESPIRATORY (INHALATION) at 02:26

## 2021-06-10 RX ADMIN — TAMSULOSIN HYDROCHLORIDE SCH MG: 0.4 CAPSULE ORAL at 08:42

## 2021-06-10 RX ADMIN — MAGNESIUM HYDROXIDE PRN ML: 400 SUSPENSION ORAL at 08:40

## 2021-06-10 RX ADMIN — LEVALBUTEROL HYDROCHLORIDE SCH MG: 1.25 SOLUTION, CONCENTRATE RESPIRATORY (INHALATION) at 13:55

## 2021-06-10 RX ADMIN — DOCUSATE SODIUM SCH MG: 100 CAPSULE, LIQUID FILLED ORAL at 08:42

## 2021-06-10 RX ADMIN — LEVALBUTEROL HYDROCHLORIDE SCH MG: 1.25 SOLUTION, CONCENTRATE RESPIRATORY (INHALATION) at 07:13

## 2021-06-10 RX ADMIN — INSULIN LISPRO SCH UNITS: 100 INJECTION, SOLUTION INTRAVENOUS; SUBCUTANEOUS at 20:13

## 2021-06-10 RX ADMIN — DEXTROSE MONOHYDRATE SCH MG: 50 INJECTION, SOLUTION INTRAVENOUS at 10:13

## 2021-06-10 NOTE — IPNPDOC
Text Note


Date of Service


The patient was seen on 6/10/21.





NOTE


Subjective: Patient was alert and oriented in the morning. Answered my questions

appropriately.








Objective:


GENERAL APPEARANCE: Not in acute distress


HEENT: no scleral icterus, plus JVD, EOMI


CARDIOVASCULAR: Irregularly irregular


LUNGS: Diminished lung sounds bilaterally chest tube in place


ABDOMEN: soft & not tender w palpitation


MUSCULOSKELETAL: no cyanosis, right below-knee amputation, left leg edema +2, 

unstageable left heel wound


INTEGUMENT: no generalized pallor


NEUROLOGICAL: cranial nerve function from 2-12 intact intact








Assessment and plan


Patient  89-year-old male history of hypertension, diabetes, peripheral vascular

disease status post femoral-popliteal bypass, status post below knee right 

amputation, hypothyroidism, peripheral neuropathy, BPH, GERD, atrial 

fibrillation who presents with increased shortness of breath secondary to acute 

CHF





Metabolic encephalopathy


Secondary to CO2 narcosis


Resolved, DC BiPAP











Acute hypercarbic  respiratory failure


Most likely secondary to large right pleural effusion due to acute CHF 

exacerbation


Thoracocentesis was done on6/10/21, 3 L of transudative fluid was evacuated


Today X-ray showed significant improvement in the right pleural effusion





Acute combined systolic and diastolic CHF


Echo showed Normal LV size with global left ventricle systolic dysfunction more


    pronounced in the distal septum and apex and overall severe left ventricle


    systolic dysfunction and estimated EF 25 to 30%. Grade 2 diastolic


    dysfunction.  


I's and O's


Cardiac diet


We intensified diuresis with Lasix IV, metolazone. I will ask cardiologist to 

follow the patient after initial stabilization





Pneumonia rule out


Patient did not have fever, chills, cough, sputum , procalcitonin negative


I discontinued antibiotics





A. fib


Oral targeted anticoagulation on hold. I will restart anticoagulation after 

chest tube removal


Heart rate under control





Peripheral vascular diseases


Continue statin, Plavix and hold





Hypertension


Continue home meds





Type 2 diabetes


Continue insulin sliding scale


Diabetes diet


Detemir twice a day


Lyrica on hold





Hypothyroidism


Synthroid





Unstageable left heel wound


Appreciated/agree with wound care specialist consult





BPH: Continue home Flomax





VS,Fishbone, I+O


VS, Fishbone, I+O


Laboratory Tests


6/10/21 04:16











Vital Signs








  Date Time  Temp Pulse Resp B/P (MAP) Pulse Ox O2 Delivery O2 Flow Rate FiO2


 


6/10/21 10:00  68 22  94 Nasal Cannula 1.0 


 


6/10/21 08:42    128/61    


 


6/10/21 08:00 98.9       


 


6/10/21 04:00        30














I&O- Last 24 Hours up to 6 AM 


 


 6/10/21





 06:00


 


Intake Total 1010 ml


 


Output Total 4765 ml


 


Balance -3755 ml

















MOUNA EDWARDS DO            Hubert 10, 2021 14:35

## 2021-06-10 NOTE — CR
CONSULTATION

DATE: 06/10/2021



CONSULTATION REQUESTED BY: Dr. Reuben Webber



REASON FOR CONSULTATION:  Treatment suggestions for left great toe and 2nd toe

wounds and left heel wound. 



Wound care telemedicine provides a visual assessment of a wound without the

benefit of physical examination. It can assist with establishing a diagnosis

and etiology. This allows for initial treatment plan. As wounds often change,

it may be necessary to modify the original care. Our recommendation is periodic

wound reassessment to monitor wound treatment. Failure to comply may result in

nonhealing of the wound, possible complications, and/or a poor outcome. The

recommendations given will serve as treatment options. As I will not be

following this patient, this care plan will require the attending physician to

give and sign the orders. Upon discharge, outpatient followup can be scheduled

at our wound care center.

Patient identified and verbal consent for the consultation was obtained. 



An 89-year-old male, admitted with congestive heart failure. Patient has a

significant past medical history involving peripheral vascular disease with a

failed right femoral distal artery bypass, which  resulted in a right

below-knee amputation, which remains healed. More recently, the patient

developed arterial wounds involving his left foot, and underwent a limb salvage

left femoral distal arterial bypass, performed in January 2021 by Dr. Koroma in

[place]. I have been asked to comment on treatment suggestions regarding

wounds involving the left great toe and left second toe and left heel. Patient

is diabetic. Hemoglobin A1c was unavailable. Patient's blood glucose while 

hospitalized has ranged from 134-210. He is alert, oriented, and does not appear


in any significant pain. According to the nurse at bedside he is eating 
satisfactorily



PHYSICAL EXAMINATION: There is a dry, fixed black eschar involving the distal 
aspect of

the left great toe and the left 2nd toe. There is eschar demarcation without 
extension into 

the web space of either digit. On left heel there is a dry fixed, black

eschar measuring 2.0 cm x 1.9 cm. the eschar edges are demarcated, and there is 
no

erythema, maceration, or ischemic change involving the periwound. No drainage is
seen.



TREATMENT SUGGESTIONS: A heel float boot is mandatory to avoid pressure

to the left heel. Foam dressings should be applied to all eschar areas for 
protection. 

The dressings should be changed every other day and inspected to see if any 
drainage 

is evident.. A hemoglobin A1c should be ordered, if not recently performed. 
Patient's diet

should be supplemented with Glucerna and Aroldo. An arterial ultrasound of the

left lower extremity is indicated to evaluate patency of the distal bypass. The 
indication for 

debridement of any of the eschars is fluctuation, purulent drainage, erythema of
the periwound

or extension and/or increase in size of the original wounds. 

MTDD

## 2021-06-10 NOTE — ECGEPIP
[facility] - ED

                                       

                                       Test Date:    2021

Pat Name:     LYDIA URBINA           Department:   

Patient ID:   [id number]                 Room:         [place] [address]

Gender:       Male                     Technician:   NELY GARCIA

:          [id number]               Requested By: SHAVONNE RIVERA

Order Number: [id number]     Reading MD:   Maxime Gan

                                 Measurements

Intervals                              Axis          

Rate:         68                       P:            

WI:                                    QRS:          16

QRSD:         90                       T:            -6

QT:           388                                    

QTc:          412                                    

                           Interpretive Statements

Sinus rhythm with occasional premature ventricular complexes

NSTTW ABNORMALITY(S)

RHYTHM/RATE CHANGE COMPARED TO 10/14/20

Electronically Signed on 6- 8:39:36 EDT by Maxime Gan

## 2021-06-10 NOTE — REP
INDICATION:

Pleural effusion, SOB, possible PNA



COMPARISON:

06/09/2021



TECHNIQUE:

PA and lateral.



FINDINGS:

Right-sided chest tube in stable position.  Very small residual right apical

pneumothorax identified.  Right-sided airspace disease has improved since prior

examination.  Lateral view demonstrates continued moderate pleural effusion.

Bibasilar atelectasis again noted.



IMPRESSION:

1.  Decreased right lower lobe airspace disease and decreased small residual right

apical pneumothorax.

2.  Right effusion and right basilar atelectasis again noted best on lateral

radiograph.







<Electronically signed by Boby Santillan > 06/10/21 0815

## 2021-06-10 NOTE — IPN
PROGRESS NOTE



DATE:  06/10/2021



SUBJECTIVE: What a difference 48 hours makes. Mr. Burdick is now completely

awake and alert and conversational. I had to reintroduce myself as he did not

remember me. 



VITAL SIGNS: His vital signs shows a T-max of 97.9 with a heart rate that

ranges between 68 and 70, in atrial fibrillation with a respiratory rate that

is constant at 21, 96% saturated on 30% FIO2 without use of accessory muscles.

His blood pressure is ranging between 129/59 to 106/51. 



INTAKE AND OUTPUT: His intake and output over the past 24 hours has been

recorded as 1010 in and 4010 out with a negativity of 3000 ml. He has put out

810 ml with the chest tube and there is no air leak. He has put out 3200 ml of

urine output and he has orally taken in 1010 ml. He weighs 79.5 kilos today

compared to 81.7 kilos yesterday. 



OBJECTIVE: 

LUNGS: He has some decreased breath sounds in the left lower hemithorax.

Percussion is also dull in the left hemithorax lower down. Both sides show

inspiratory rales. Right side additionally has some inspiratory coarse rhonchi.

Percussion notes full to the diaphragm on the right.

CARDIAC: Still shows distant sounds without murmurs, clicks, gallops or rubs. I

cannot feel his PMI. S1 and S2 are normal.

ABDOMEN: Soft, nontender, bowel sounds are positive. There is no hepatomegaly.

No CVA tenderness. 

EXTREMITIES:  His left extremity shows no pretibial edema. He still has the

gangrenous changes on the great toe. There is no differential swelling of the

upper extremities.

SKIN: Warm, dry and perfused without cyanosis or mottling including that of

nailbeds and knees.

NECK: Supple. There is no jugular venous distention, no subcutaneous emphysema.

Trachea is midline. 

HEENT: Mouth shows his mucous membranes to be pink and moist. Lips and

commissures are without lesions. No thrush. Eyes shows pupils to be reactive.

Extraocular muscles are intact. Sclera are nonicteric.

NEUROLOGIC: Cranial nerves II through XII are intact. Gross sensation intact.

Gait is not tested.

PSYCHIATRIC: Awake, alert and oriented  x3 with appropriate mood and affect,

and conversational.



LABORATORY DATA:  His white count today is 10.8 with a hemoglobin and 

hematocrit of 9.0 and 30.0, essentially unchanged from yesterday with a

platelet count of 215,000 and stable. Differential shows 70% neutrophils, 15%

lymphocytes, 11% monocytes.  There are no immature forms and no toxic

granulations. 



His electrolytes shows a potassium of 3.3 with a total CO2 of 40 and a BUN and

creatinine of 44 and 1.39, up from 36 and 1.07 yesterday. His Toradol was

discontinued yesterday. Glucose is 134 with a calcium of 8.1. 



His chest x-ray today shows a lung fully expanded to chest wall.  There may be

an effusion on the lateral view comparable to the left side. Chest tube is in

good place. Post expansion pulmonary edema is resolving on the right side. The

cardiac silhouette is normal and not globular. 



His echocardiogram shows severe systolic dysfunction with an ejection fraction

of 25% to 30%.  That explains his congestive heart failure. 



IMPRESSION: 

  1.  Large right pleural effusion resolved with a chest tube.

  2.  Respiratory failure, resolved.

  3.  Hypercarbia, resolving.

  4.  Severe systolic dysfunction.

  5.  Severe peripheral vascular disease.

  6.  Necrotic right toe. 

  7.  CO2 narcosis, resolved.

  8.  Diabetes.

  9.  Hypertension.

  10.Hypothyroidism.

  11.GERD.

  12.Atrial fibrillation.



PLAN/DISCUSSION:  He is still putting out too much out of the chest tube to

remove it. He is being diuresed. I would recommend continuing aggressive

diuresis strategy. Will continue to follow his chest x-rays as his left pleural

effusion may have increased. 



We will wean his oxygen as tolerated.  I would not be too concerned about his

rising creatinine in the face of diuresis. I specifically discontinued his

Toradol yesterday in anticipation of volume contraction with resultant

decreased GFR.

## 2021-06-11 VITALS — DIASTOLIC BLOOD PRESSURE: 60 MMHG | SYSTOLIC BLOOD PRESSURE: 132 MMHG

## 2021-06-11 VITALS — DIASTOLIC BLOOD PRESSURE: 51 MMHG | SYSTOLIC BLOOD PRESSURE: 126 MMHG

## 2021-06-11 VITALS — SYSTOLIC BLOOD PRESSURE: 124 MMHG | DIASTOLIC BLOOD PRESSURE: 58 MMHG

## 2021-06-11 VITALS — DIASTOLIC BLOOD PRESSURE: 60 MMHG | SYSTOLIC BLOOD PRESSURE: 126 MMHG

## 2021-06-11 VITALS — DIASTOLIC BLOOD PRESSURE: 58 MMHG | SYSTOLIC BLOOD PRESSURE: 124 MMHG

## 2021-06-11 VITALS — DIASTOLIC BLOOD PRESSURE: 68 MMHG | SYSTOLIC BLOOD PRESSURE: 128 MMHG

## 2021-06-11 VITALS — SYSTOLIC BLOOD PRESSURE: 121 MMHG | DIASTOLIC BLOOD PRESSURE: 56 MMHG

## 2021-06-11 VITALS — SYSTOLIC BLOOD PRESSURE: 144 MMHG | DIASTOLIC BLOOD PRESSURE: 65 MMHG

## 2021-06-11 LAB
BACTERIA ID TEST ISLT QL CULT: (no result)
BACTERIA SPEC BFLD CULT: (no result)
BASOPHILS # BLD AUTO: 0 10^3/UL (ref 0–0.2)
BASOPHILS NFR BLD AUTO: 0.5 % (ref 0–1)
BUN SERPL-MCNC: 45 MG/DL (ref 7–18)
CALCIUM SERPL-MCNC: 8.6 MG/DL (ref 8.8–10.2)
CHLORIDE SERPL-SCNC: 92 MEQ/L (ref 98–107)
CO2 SERPL-SCNC: 44 MEQ/L (ref 21–32)
CREAT SERPL-MCNC: 1.31 MG/DL (ref 0.7–1.3)
EOSINOPHIL # BLD AUTO: 0.5 10^3/UL (ref 0–0.5)
EOSINOPHIL NFR BLD AUTO: 6.1 % (ref 0–3)
EST. AVERAGE GLUCOSE BLD GHB EST-MCNC: 197 MG/DL (ref 60–110)
GFR SERPL CREATININE-BSD FRML MDRD: 54.8 ML/MIN/{1.73_M2} (ref 35–?)
GLUCOSE SERPL-MCNC: 205 MG/DL (ref 70–100)
HCT VFR BLD AUTO: 32.3 % (ref 42–52)
HGB BLD-MCNC: 9.7 G/DL (ref 13.5–17.5)
LYMPHOCYTES # BLD AUTO: 1.3 10^3/UL (ref 1.5–5)
LYMPHOCYTES NFR BLD AUTO: 14.3 % (ref 24–44)
MCH RBC QN AUTO: 21.7 PG (ref 27–33)
MCHC RBC AUTO-ENTMCNC: 30 G/DL (ref 32–36.5)
MCV RBC AUTO: 72.1 FL (ref 80–96)
MONOCYTES # BLD AUTO: 1.2 10^3/UL (ref 0–0.8)
MONOCYTES NFR BLD AUTO: 14.2 % (ref 2–8)
NEUTROPHILS # BLD AUTO: 5.7 10^3/UL (ref 1.5–8.5)
NEUTROPHILS NFR BLD AUTO: 64.6 % (ref 36–66)
PLATELET # BLD AUTO: 264 10^3/UL (ref 150–450)
POTASSIUM SERPL-SCNC: 3.3 MEQ/L (ref 3.5–5.1)
RBC # BLD AUTO: 4.48 10^6/UL (ref 4.3–6.1)
SODIUM SERPL-SCNC: 137 MEQ/L (ref 136–145)
WBC # BLD AUTO: 8.7 10^3/UL (ref 4–10)

## 2021-06-11 RX ADMIN — LEVALBUTEROL HYDROCHLORIDE SCH MG: 1.25 SOLUTION, CONCENTRATE RESPIRATORY (INHALATION) at 19:22

## 2021-06-11 RX ADMIN — PRAVASTATIN SODIUM SCH MG: 20 TABLET ORAL at 22:02

## 2021-06-11 RX ADMIN — PANTOPRAZOLE SODIUM SCH MG: 40 TABLET, DELAYED RELEASE ORAL at 08:25

## 2021-06-11 RX ADMIN — LEVALBUTEROL HYDROCHLORIDE SCH MG: 1.25 SOLUTION, CONCENTRATE RESPIRATORY (INHALATION) at 07:12

## 2021-06-11 RX ADMIN — INSULIN DETEMIR SCH UNITS: 100 INJECTION, SOLUTION SUBCUTANEOUS at 08:26

## 2021-06-11 RX ADMIN — LEVALBUTEROL HYDROCHLORIDE SCH MG: 1.25 SOLUTION, CONCENTRATE RESPIRATORY (INHALATION) at 15:28

## 2021-06-11 RX ADMIN — INSULIN LISPRO SCH UNITS: 100 INJECTION, SOLUTION INTRAVENOUS; SUBCUTANEOUS at 22:00

## 2021-06-11 RX ADMIN — HYDROCODONE BITARTRATE AND ACETAMINOPHEN PRN TAB: 5; 325 TABLET ORAL at 22:07

## 2021-06-11 RX ADMIN — TAMSULOSIN HYDROCHLORIDE SCH MG: 0.4 CAPSULE ORAL at 08:25

## 2021-06-11 RX ADMIN — DEXTROSE MONOHYDRATE SCH MG: 50 INJECTION, SOLUTION INTRAVENOUS at 22:05

## 2021-06-11 RX ADMIN — DEXTROSE MONOHYDRATE SCH MG: 50 INJECTION, SOLUTION INTRAVENOUS at 10:06

## 2021-06-11 RX ADMIN — ACETAMINOPHEN PRN MG: 325 TABLET ORAL at 12:35

## 2021-06-11 RX ADMIN — DEXTROSE MONOHYDRATE SCH MG: 50 INJECTION, SOLUTION INTRAVENOUS at 04:52

## 2021-06-11 RX ADMIN — DEXTROSE MONOHYDRATE SCH MG: 50 INJECTION, SOLUTION INTRAVENOUS at 15:46

## 2021-06-11 RX ADMIN — DOCUSATE SODIUM SCH MG: 100 CAPSULE, LIQUID FILLED ORAL at 08:25

## 2021-06-11 RX ADMIN — SODIUM CHLORIDE SCH UNITS: 4.5 INJECTION, SOLUTION INTRAVENOUS at 08:26

## 2021-06-11 RX ADMIN — INSULIN LISPRO SCH UNITS: 100 INJECTION, SOLUTION INTRAVENOUS; SUBCUTANEOUS at 11:28

## 2021-06-11 RX ADMIN — INSULIN DETEMIR SCH UNITS: 100 INJECTION, SOLUTION SUBCUTANEOUS at 22:01

## 2021-06-11 RX ADMIN — LEVOTHYROXINE SODIUM SCH MCG: 50 TABLET ORAL at 05:22

## 2021-06-11 RX ADMIN — INSULIN LISPRO SCH UNITS: 100 INJECTION, SOLUTION INTRAVENOUS; SUBCUTANEOUS at 08:20

## 2021-06-11 RX ADMIN — DOCUSATE SODIUM SCH MG: 100 CAPSULE, LIQUID FILLED ORAL at 22:02

## 2021-06-11 RX ADMIN — LEVALBUTEROL HYDROCHLORIDE SCH MG: 1.25 SOLUTION, CONCENTRATE RESPIRATORY (INHALATION) at 01:52

## 2021-06-11 RX ADMIN — INSULIN LISPRO SCH UNITS: 100 INJECTION, SOLUTION INTRAVENOUS; SUBCUTANEOUS at 17:12

## 2021-06-11 RX ADMIN — SODIUM CHLORIDE SCH UNITS: 4.5 INJECTION, SOLUTION INTRAVENOUS at 22:01

## 2021-06-11 NOTE — REP
INDICATION:

pleural effusion



COMPARISON:

06/10/2021



TECHNIQUE:

PA and lateral.



FINDINGS:

Right-sided chest tube in stable position and there is no obvious residual right

pneumothorax or definite pleural effusion/consolidation.



Lateral view best demonstrates small stable possibly left-sided pleural effusion.

Left hemithorax is stable with retrocardiac opacity suggesting left lower lobe

consolidation/partial collapse.



Mediastinum and cardiac silhouette are stable and within normal limits.  Skeletal

structures stable.



IMPRESSION:

1.  Stable appearance of the right hemithorax.

2.  Left lower lobe consolidation/partial collapse and suspected left pleural effusion.





<Electronically signed by Boby Santillan > 06/11/21 0810

## 2021-06-11 NOTE — IPN
PROGRESS NOTE



DATE:  06/11/2021



SUBJECTIVE: Mr. Burdick is doing well today, although he is a little bit more

confused today and trying to get out of bed. Nonetheless he knows where he is. 



His vital signs show a T-max of 98.1 with a heart rate ranging between 68 and

74.



ADDENDUM/CONTINUATION

His heart rate is ranging between 68-74 in atrial fibrillation with a

respiratory rate that is constant at 18 without the use of accessory muscles,

who is 94%-96% saturated on room air and whose blood pressure is ranging

between 121/56 to 144/65. 



His intake and output for the past 24 hours has been recorded as 980 in and

5380 out, for a negativity of 4400 mL. His chest tube output is down to 300 mL,

and his urine output was 5000 mL. Weight today is 73.5 kg compared to 79.5 kg

yesterday.



PHYSICAL EXAMINATION: He has equal breath sounds on either side. I hear no

wheezes or rhonchi. There are a few inspiratory rales at the very end of

inspiration. Percussion notes are full to the diaphragm. Cardiac exam is

without murmurs, clicks, gallops, or rubs. I cannot feel his point of maximal

impulse (PMI). S1 and S2 are normal. Abdomen is soft and nontender. Bowel

sounds are positive. There is no hepatomegaly. No costovertebral angle (CVA)

tenderness. Extremities show no pretibial edema in the left leg. His right

great toe is covered but has been seen by Dr. Stillerman, and it is dry

gangrene. Skin is warm, dry, and perfused without cyanosis or mottling,

including that of the nailbeds and knees. Neck is supple. There is no jugular

venous distention. No subcutaneous emphysema. Trachea is midline. Mouth shows

the mucous membranes to be pink and moist. Lips and commissures without

lesions. No thrush. Eyes show his pupils to be equal and reactive. Extraocular

motion intact. Sclerae anicteric. Neurologic shows II-XII intact. Normal gross

motor, gross sensation intact. Gait is not tested. Psychiatric shows him to be

slightly confused today but does know where he is. 



His white count today is 8.7 with a hemoglobin and hematocrit of 9.7 and 32.3,

up from 9.0 and 30% yesterday, consistent with hemoconcentration. Platelet

count is 264 with a differential that shows 64% neutrophils, 14% lymphocytes,

14% monocytes. There are no immature forms or toxic granulations.



His chemistries today show potassium 3.3 with a BUN and creatinine of 45 and

1.31, essentially unchanged from yesterday. Glucose is 205 with a calcium of

8.6. 



His chest x-ray today shows his lung fully expanded to the chest wall. Chest

tube is in good place. I see no infiltrates. There may be a slight pleural

effusion of meniscus on the lateral film.



IMPRESSION: 

1. Large right pleural effusion, resolved with a chest tube and decreasing.

2. Respiratory failure, resolved.

3. Hypercapnia, resolving.

4. Severe systolic dysfunction.

5. Severe peripheral vascular disease.

6. Necrotic right toe.

7. CO2 narcosis, resolved.

8. Diabetes.

9. Hypertension. 

10. Hypothyroidism.

11. Gastroesophageal reflux disease.

12. Atrial fibrillation. 



PLAN AND DISCUSSION: He has had an impressive diuresis. It looks as though he

is doing quite well with it. Chest tube output has markedly gone down. I will

discontinue chest tube suction today. Hopefully tomorrow I will be able to

remove the chest tube.

## 2021-06-11 NOTE — REP
INDICATION:

to check patency of graft to left lower leg.



COMPARISON:



None.







TECHNIQUE:

Real time gray scale and Duplex Doppler evaluation of the left lower extremity

arterial vasculature using linear high frequency transducer.



FINDINGS:

The ankle to brachial index of the left lower extremity is 0.7.  Severe plaque and

stents are seen in the mid to distal superficial femoral artery.  There is a patent

bypass graft popliteal artery to mid to distal posterior tibial artery.  Flow

velocities within the bypass graft range between 17.2 in 28.1 centimeters/second.

There are diffuse biphasic waveforms, with monophasic waveforms throughout the

posterior tibial artery and in the distal anterior tibial artery.  There is reversal

of flow in the mid posterior tibial artery superior to the anastomosis of the bypass

graft.





PSV(cm/sec)



Common femoral artery:   150 cm/s

Profunda femoris artery:  132 cm/s

Proximal superficial femoral artery:  126 cm/s

Mid superficial femoral artery:  116 cm/s

Distal superficial femoral artery:  118 cm/s

Popliteal artery:  98 cm/s

Proximal DEMAR:  36 cm/s

Tibioperoneal trunk:  38 cm/s

Proximal PTA:  59 cm/s

Distal PTA:  89 cm/s

Distal DEMAR:  29 cm/s





IMPRESSION:

Patent popliteal to posterior tibial artery bypass graft as discussed above.





<Electronically signed by Dalton Wright > 06/11/21 [id number]

## 2021-06-11 NOTE — IPNPDOC
Text Note


Date of Service


The patient was seen on 6/11/21.





NOTE


Subjective: No any acute events overnight. No fever or chills. Patient stated 

that he feels much better today








Objective:


GENERAL APPEARANCE: Not in acute distress


HEENT: no scleral icterus, plus JVD, EOMI


CARDIOVASCULAR: Irregularly irregular


LUNGS: Diminished lung sounds bilaterally chest tube in place


ABDOMEN: soft & not tender w palpitation


MUSCULOSKELETAL: no cyanosis, right below-knee amputation, left leg edema +2, 

unstageable left heel wound


INTEGUMENT: no generalized pallor


NEUROLOGICAL: cranial nerve function from 2-12 intact intact








Assessment and plan


Patient  89-year-old male history of hypertension, diabetes, peripheral vascular

disease status post femoral-popliteal bypass, status post below knee right 

amputation, hypothyroidism, peripheral neuropathy, BPH, GERD, atrial 

fibrillation who presents with increased shortness of breath secondary to acute 

CHF





Metabolic encephalopathy


Secondary to CO2 narcosis


Resolved











Acute hypercarbic  respiratory failure


Resolved


Most likely secondary to large right pleural effusion due to acute CHF 

exacerbation


Thoracocentesis was done on6/10/21, 3 L of transudative fluid was evacuated


Today X-ray showed Stable appearance of the right hemithorax. 2.  Left lower 

lobe consolidation/partial collapse and suspected left pleural effusion.


Dr. Hu follows patient








Acute combined systolic and diastolic CHF


Echo showed Normal LV size with global left ventricle systolic dysfunction more


    pronounced in the distal septum and apex and overall severe left ventricle


    systolic dysfunction and estimated EF 25 to 30%. Grade 2 diastolic


    dysfunction.  


I's and O's


Cardiac diet


We intensified diuresis with Lasix IV, metolazone. I will ask cardiologist to 

follow the patient after initial stabilization and chest tube removal





Pneumonia rule out


Patient did not have fever, chills, cough, sputum , procalcitonin negative


I discontinued antibiotics





A. fib


Oral targeted anticoagulation on hold. I will restart anticoagulation after 

chest tube removal


Heart rate under control





Peripheral vascular diseases


Continue statin, Plavix and hold





Hypertension


Continue home meds





Type 2 diabetes


Continue insulin sliding scale


Diabetes diet


Detemir twice a day


Lyrica on hold





Hypothyroidism


Synthroid





Unstageable left heel wound


Follow Dr. Stillerman recommendation


Await arterial ultrasound of left lower extremity





BPH: Continue home Flomax





VS,Fishbone, I+O


VS, Fishbone, I+O


Laboratory Tests


6/11/21 04:04











Vital Signs








  Date Time  Temp Pulse Resp B/P (MAP) Pulse Ox O2 Delivery O2 Flow Rate FiO2


 


6/11/21 12:00 97.5 72 18 121/56 (77) 96 Room Air  


 


6/10/21 16:00       0.5 


 


6/10/21 04:00        30














I&O- Last 24 Hours up to 6 AM 


 


 6/11/21





 06:00


 


Intake Total 980 ml


 


Output Total 5125 ml


 


Balance -4145 ml

















MOUNA EDWARDS DO            Jun 11, 2021 13:25

## 2021-06-12 VITALS — SYSTOLIC BLOOD PRESSURE: 132 MMHG | DIASTOLIC BLOOD PRESSURE: 63 MMHG

## 2021-06-12 VITALS — SYSTOLIC BLOOD PRESSURE: 127 MMHG | DIASTOLIC BLOOD PRESSURE: 60 MMHG

## 2021-06-12 VITALS — DIASTOLIC BLOOD PRESSURE: 63 MMHG | SYSTOLIC BLOOD PRESSURE: 136 MMHG

## 2021-06-12 VITALS — SYSTOLIC BLOOD PRESSURE: 134 MMHG | DIASTOLIC BLOOD PRESSURE: 60 MMHG

## 2021-06-12 VITALS — DIASTOLIC BLOOD PRESSURE: 65 MMHG | SYSTOLIC BLOOD PRESSURE: 149 MMHG

## 2021-06-12 VITALS — DIASTOLIC BLOOD PRESSURE: 56 MMHG | SYSTOLIC BLOOD PRESSURE: 121 MMHG

## 2021-06-12 VITALS — SYSTOLIC BLOOD PRESSURE: 117 MMHG | DIASTOLIC BLOOD PRESSURE: 59 MMHG

## 2021-06-12 VITALS — DIASTOLIC BLOOD PRESSURE: 65 MMHG | SYSTOLIC BLOOD PRESSURE: 141 MMHG

## 2021-06-12 VITALS — DIASTOLIC BLOOD PRESSURE: 64 MMHG | SYSTOLIC BLOOD PRESSURE: 135 MMHG

## 2021-06-12 VITALS — DIASTOLIC BLOOD PRESSURE: 59 MMHG | SYSTOLIC BLOOD PRESSURE: 128 MMHG

## 2021-06-12 VITALS — SYSTOLIC BLOOD PRESSURE: 133 MMHG | DIASTOLIC BLOOD PRESSURE: 64 MMHG

## 2021-06-12 VITALS — DIASTOLIC BLOOD PRESSURE: 56 MMHG | SYSTOLIC BLOOD PRESSURE: 125 MMHG

## 2021-06-12 VITALS — DIASTOLIC BLOOD PRESSURE: 59 MMHG | SYSTOLIC BLOOD PRESSURE: 119 MMHG

## 2021-06-12 VITALS — SYSTOLIC BLOOD PRESSURE: 140 MMHG | DIASTOLIC BLOOD PRESSURE: 66 MMHG

## 2021-06-12 LAB
BASE EXCESS BLDA CALC-SCNC: 22.6 MMOL/L (ref -2–2)
BASOPHILS # BLD AUTO: 0 10^3/UL (ref 0–0.2)
BASOPHILS NFR BLD AUTO: 0.4 % (ref 0–1)
BUN SERPL-MCNC: 47 MG/DL (ref 7–18)
CALCIUM SERPL-MCNC: 9 MG/DL (ref 8.8–10.2)
CHLORIDE SERPL-SCNC: 88 MEQ/L (ref 98–107)
CO2 BLDA CALC-SCNC: 50.6 MEQ/L (ref 23–31)
CO2 SERPL-SCNC: 50 MEQ/L (ref 21–32)
CREAT SERPL-MCNC: 1.36 MG/DL (ref 0.7–1.3)
EOSINOPHIL # BLD AUTO: 0.6 10^3/UL (ref 0–0.5)
EOSINOPHIL NFR BLD AUTO: 6.9 % (ref 0–3)
GFR SERPL CREATININE-BSD FRML MDRD: 52.5 ML/MIN/{1.73_M2} (ref 35–?)
GLUCOSE SERPL-MCNC: 166 MG/DL (ref 70–100)
HCO3 BLDA-SCNC: 48.8 MEQ/L (ref 22–26)
HCO3 STD BLDA-SCNC: 47.1 MEQ/L (ref 22–26)
HCT VFR BLD AUTO: 33.5 % (ref 42–52)
HGB BLD-MCNC: 10 G/DL (ref 13.5–17.5)
LYMPHOCYTES # BLD AUTO: 1.7 10^3/UL (ref 1.5–5)
LYMPHOCYTES NFR BLD AUTO: 18.2 % (ref 24–44)
MCH RBC QN AUTO: 21.4 PG (ref 27–33)
MCHC RBC AUTO-ENTMCNC: 29.9 G/DL (ref 32–36.5)
MCV RBC AUTO: 71.6 FL (ref 80–96)
MONOCYTES # BLD AUTO: 1.2 10^3/UL (ref 0–0.8)
MONOCYTES NFR BLD AUTO: 13.3 % (ref 2–8)
NEUTROPHILS # BLD AUTO: 5.6 10^3/UL (ref 1.5–8.5)
NEUTROPHILS NFR BLD AUTO: 60.8 % (ref 36–66)
PCO2 BLDA: 61.1 MMHG (ref 35–45)
PH BLDA: 7.52 UNITS (ref 7.35–7.45)
PLATELET # BLD AUTO: 303 10^3/UL (ref 150–450)
PO2 BLDA: 84.4 MMHG (ref 75–100)
POTASSIUM SERPL-SCNC: 3.3 MEQ/L (ref 3.5–5.1)
RBC # BLD AUTO: 4.68 10^6/UL (ref 4.3–6.1)
SAO2 % BLDA: 96.8 % (ref 95–99)
SODIUM SERPL-SCNC: 139 MEQ/L (ref 136–145)
WBC # BLD AUTO: 9.1 10^3/UL (ref 4–10)

## 2021-06-12 RX ADMIN — INSULIN LISPRO SCH UNITS: 100 INJECTION, SOLUTION INTRAVENOUS; SUBCUTANEOUS at 12:00

## 2021-06-12 RX ADMIN — POTASSIUM CHLORIDE SCH MLS/HR: 7.46 INJECTION, SOLUTION INTRAVENOUS at 17:24

## 2021-06-12 RX ADMIN — DOCUSATE SODIUM SCH MG: 100 CAPSULE, LIQUID FILLED ORAL at 20:58

## 2021-06-12 RX ADMIN — PANTOPRAZOLE SODIUM SCH MG: 40 TABLET, DELAYED RELEASE ORAL at 12:37

## 2021-06-12 RX ADMIN — INSULIN DETEMIR SCH UNITS: 100 INJECTION, SOLUTION SUBCUTANEOUS at 11:31

## 2021-06-12 RX ADMIN — DEXTROSE MONOHYDRATE SCH MG: 50 INJECTION, SOLUTION INTRAVENOUS at 05:04

## 2021-06-12 RX ADMIN — SODIUM CHLORIDE SCH UNITS: 4.5 INJECTION, SOLUTION INTRAVENOUS at 11:30

## 2021-06-12 RX ADMIN — TAMSULOSIN HYDROCHLORIDE SCH MG: 0.4 CAPSULE ORAL at 12:37

## 2021-06-12 RX ADMIN — POTASSIUM CHLORIDE SCH MLS/HR: 7.46 INJECTION, SOLUTION INTRAVENOUS at 19:22

## 2021-06-12 RX ADMIN — PRAVASTATIN SODIUM SCH MG: 20 TABLET ORAL at 20:57

## 2021-06-12 RX ADMIN — DOCUSATE SODIUM SCH MG: 100 CAPSULE, LIQUID FILLED ORAL at 12:37

## 2021-06-12 RX ADMIN — LEVALBUTEROL HYDROCHLORIDE SCH MG: 1.25 SOLUTION, CONCENTRATE RESPIRATORY (INHALATION) at 19:43

## 2021-06-12 RX ADMIN — INSULIN DETEMIR SCH UNITS: 100 INJECTION, SOLUTION SUBCUTANEOUS at 20:56

## 2021-06-12 RX ADMIN — INSULIN LISPRO SCH UNITS: 100 INJECTION, SOLUTION INTRAVENOUS; SUBCUTANEOUS at 20:55

## 2021-06-12 RX ADMIN — LEVALBUTEROL HYDROCHLORIDE SCH MG: 1.25 SOLUTION, CONCENTRATE RESPIRATORY (INHALATION) at 02:20

## 2021-06-12 RX ADMIN — LEVALBUTEROL HYDROCHLORIDE SCH MG: 1.25 SOLUTION, CONCENTRATE RESPIRATORY (INHALATION) at 07:24

## 2021-06-12 RX ADMIN — SODIUM CHLORIDE SCH UNITS: 4.5 INJECTION, SOLUTION INTRAVENOUS at 21:01

## 2021-06-12 RX ADMIN — POTASSIUM CHLORIDE SCH MLS/HR: 7.46 INJECTION, SOLUTION INTRAVENOUS at 16:10

## 2021-06-12 RX ADMIN — INSULIN LISPRO SCH UNITS: 100 INJECTION, SOLUTION INTRAVENOUS; SUBCUTANEOUS at 07:30

## 2021-06-12 RX ADMIN — INSULIN LISPRO SCH UNITS: 100 INJECTION, SOLUTION INTRAVENOUS; SUBCUTANEOUS at 17:25

## 2021-06-12 RX ADMIN — POTASSIUM CHLORIDE SCH MLS/HR: 7.46 INJECTION, SOLUTION INTRAVENOUS at 14:51

## 2021-06-12 RX ADMIN — LEVOTHYROXINE SODIUM SCH MCG: 50 TABLET ORAL at 05:04

## 2021-06-12 RX ADMIN — LEVALBUTEROL HYDROCHLORIDE SCH MG: 1.25 SOLUTION, CONCENTRATE RESPIRATORY (INHALATION) at 13:16

## 2021-06-12 RX ADMIN — ACETAMINOPHEN PRN MG: 325 TABLET ORAL at 22:25

## 2021-06-12 NOTE — IPN
-lovenox 40mg PROGRESS NOTE



DATE:  06/12/2021



Mr. Burdick is quite somnolent today. He was given a Norco last night, and his

somnolence is being attributed to the Norco. 



His vital signs show a maximum temperature of 97.4 with a heart rate that

ranges between 68-69 in atrial fibrillation, well controlled, with a blood

pressure that is ranging between 149/67 to 117/59, and a respiratory rate of

18-22 without the use of accessory muscles, who is 92%-98% saturated on 2

liters nasal cannula.



His intake and output for the past 24 hours has been recorded as 630 in and

4060 out, for a negativity of 3400 mL. He has put out 75 mL from the chest

tube, and there is no air leak. He has put 3745 mL in urine. He weighs 72.5 kg

today compared to 73.5 kg yesterday.



PHYSICAL EXAMINATION: He has bilateral inspiratory crackles and rales on both

sides, particularly in the lower hemithoraces. Percussion notes are full to the

diaphragm. Cardiac exam is without murmurs, clicks, gallops, or rubs.  I cannot

feel  her point of maximal impulse (PMI). S1 and S2 are normal. Abdomen is soft

and nontender. Bowel sounds are positive. There is no hepatomegaly. No

costovertebral angle (CVA) tenderness. His left extremity shows no pretibial

edema, no calf tenderness, no differential swelling of the upper extremities.

His skin is warm, dry, and perfused without cyanosis, including that of the

nailbeds and knees. Neck is supple. There is no jugular venous distention. No

subcutaneous emphysema. Trachea is midline. Mouth shows the mucous membranes to

be pink and moist. Lips and commissures without lesions. No thrush. Eyes show

his pupils to be equal and reactive. Extraocular motion intact. Sclerae

anicteric. Neurologic shows II-XII intact. Normal gross motor, gross sensation

intact. Gait is not tested. Psychiatric shows him to be somnolent. 



His white count today is 9.1 with a hemoglobin and hematocrit of 10.0 and 33.5,

slightly up from 9.7 and 32.3 yesterday. Platelet count is 303 and stable, and

differential shows 60% neutrophils, 18% lymphocytes, 13% monocytes. There are

no immature forms or toxic granulations.



His electrolytes show a potassium of 3.3, unchanged from yesterday, with a

total CO2, however, of 50, and a BUN and creatinine of 47 and 1.36.  His anion

gap is only 1. 



There are no blood gases done today, and I will order one. He may need to go

back on bilevel positive airway pressure (BiPAP) for severe CO2 retention.



His chest x-ray shows his lung fully expanded to the chest wall. Costophrenic

angles are sharp, and the chest tube is in good place. There is no subcutaneous

emphysema. CT ratio is normal.



IMPRESSION: 

1. Large right pleural effusion, resolved with a chest tube and decreased. 

2. Respiratory failure, resolved.

3. Hypercapnia, perhaps returning.

4. Severe systolic dysfunction.

5. Severe peripheral vascular disease.

6. Necrotic right toe.

7. CO2 narcosis, probably reappearing.

8. Diabetes. 

9. Hypertension. 

10. Hypothyroidism. 

11. Gastroesophageal reflux disease.

12. Atrial fibrillation.

13. 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



PLAN AND DISCUSSION: His chest tube has only put out 75 mL, and I will

therefore remove it. I am concerned about his somnolence. One Norco should not

explain him being so somnolent. He moves all his extremities. I do not think

this represents a lateralizing cerebrovascular accident (CVA). I will obtain a

blood gas. -lovenox 40mg -lovenox 40mg -lovenox 40mg -lovenox 40mg -lovenox 40mg

## 2021-06-12 NOTE — IPNPDOC
Text Note


Date of Service


The patient was seen on 6/12/21.





NOTE


Hospitalist Progress Note





Subjective:


And patient was asleep when I entered the room, but he was easily aroused him. 

He states that he is feeling well today, and he is not feeling short of breath 

at this time. He does not have any specific complaints. He is not coughing.





Objective:


General: Awake, alert, oriented 3. Not in any acute distress.


HEENT: Head normocephalic, atraumatic, sclera are nonicteric. Hearing is grossly

intact to conversation. Perhaps he still does have some mild JVD.


Respiratory: Clear to auscultation bilaterally with no wheezes, rales, or 

rhonchi.


Cardiovascular: Distant heart sounds, I cannot perceive any rubs, gallops, or 

murmur.


Abdomen: Soft, nontender, nondistended, no hepatosplenomegaly appreciated. Bowel

sounds present.


Extremities: 2+ pulses in the radial and dorsalis pedis bilaterally. No evidence

of clubbing or cyanosis. Status post right BKA. He does have a wound on the left

great toe. He no longer appears to be fluid overloaded at this time, he does not

have any pitting edema in the lower extremity or the base of his thighs 

bilaterally. She does exhibit some tenting in the upper extremity, and his 

mucous membranes do appear to be dry at this time.





Assessment:


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 

ejection fraction of 25-30% and grade 2 diastolic dysfunction


Metabolic encephalopathy secondary to CO2 narcosis


Acute hypercarbic respiratory failure, resolved


Prior suspicion for pneumonia, it does not appear that he had this, antibiotics 

were discontinued


Chronic Atrial fibrillation


Peripheral vascular disease


Hypertension


Type 2 diabetes mellitus


Hypothyroidism


Left lower extremity wound


Benign prostatic hypertrophy





Plan:


He has diuresed quite well, and this time he appears to be euvolemic, if in fact

perhaps a little bit dry. I will resume his home dose of Lasix 40 mg by mouth 

daily, and discontinue the IV Lasix and metolazone.  


His anticoagulation and antiplatelet medications for atrial fibrillation per

ipheral vascular disease are still on hold, these can be resumed once his chest 

tube has been removed.  The determination as to when to remove his chest tube 

was made by cardiothoracic surgery.


Otherwise, no changes in the remainder of his regimen regarding his 

hypertension, diabetes, hypothyroidism, and the remainder of his home 

medications.





VS,Fishbone, I+O


VS, Fishbone, I+O


Laboratory Tests


6/12/21 04:34











Vital Signs








  Date Time  Temp Pulse Resp B/P (MAP) Pulse Ox O2 Delivery O2 Flow Rate FiO2


 


6/12/21 04:00       2.0 


 


6/12/21 00:00 97.4 69 18 121/56 (77) 92 Nasal Cannula  


 


6/10/21 04:00        30














I&O- Last 24 Hours up to 6 AM 


 


 6/12/21





 05:59


 


Intake Total 990 ml


 


Output Total 3610 ml


 


Balance -2620 ml

















BELKIS GONZALEZ DO               Jun 12, 2021 08:19

## 2021-06-12 NOTE — REP
INDICATION:

pleural effusion.



COMPARISON:

Comparison chest x-ray June 11, 2021.



TECHNIQUE:

Two views..



FINDINGS:

Right-sided chest tube remains in place.  There is no evidence of pneumothorax.  There

is blunting of the posterior pleural angles on the lateral radiograph similar to

yesterday's study.  There is hazy opacity in the left base on the frontal view which

may be a small amount of left pleural effusion.  Heart size is borderline unchanged.

No infiltrate is seen.  Platelike atelectasis versus linear fibrosis is noted in the

left base unchanged.



IMPRESSION:

Suspect small left effusion.  Right chest tube remains in place..





<Electronically signed by James Loredo > 06/12/21 0906

## 2021-06-13 VITALS — SYSTOLIC BLOOD PRESSURE: 122 MMHG | DIASTOLIC BLOOD PRESSURE: 58 MMHG

## 2021-06-13 VITALS — SYSTOLIC BLOOD PRESSURE: 133 MMHG | DIASTOLIC BLOOD PRESSURE: 59 MMHG

## 2021-06-13 VITALS — DIASTOLIC BLOOD PRESSURE: 48 MMHG | SYSTOLIC BLOOD PRESSURE: 113 MMHG

## 2021-06-13 VITALS — DIASTOLIC BLOOD PRESSURE: 58 MMHG | SYSTOLIC BLOOD PRESSURE: 117 MMHG

## 2021-06-13 VITALS — DIASTOLIC BLOOD PRESSURE: 67 MMHG | SYSTOLIC BLOOD PRESSURE: 147 MMHG

## 2021-06-13 VITALS — DIASTOLIC BLOOD PRESSURE: 56 MMHG | SYSTOLIC BLOOD PRESSURE: 116 MMHG

## 2021-06-13 LAB
BASOPHILS # BLD AUTO: 0 10^3/UL (ref 0–0.2)
BASOPHILS NFR BLD AUTO: 0.4 % (ref 0–1)
BUN SERPL-MCNC: 48 MG/DL (ref 7–18)
CALCIUM SERPL-MCNC: 8.7 MG/DL (ref 8.8–10.2)
CHLORIDE SERPL-SCNC: 88 MEQ/L (ref 98–107)
CO2 SERPL-SCNC: 44 MEQ/L (ref 21–32)
CREAT SERPL-MCNC: 1.32 MG/DL (ref 0.7–1.3)
EOSINOPHIL # BLD AUTO: 0.5 10^3/UL (ref 0–0.5)
EOSINOPHIL NFR BLD AUTO: 5.9 % (ref 0–3)
GFR SERPL CREATININE-BSD FRML MDRD: 54.4 ML/MIN/{1.73_M2} (ref 35–?)
GLUCOSE SERPL-MCNC: 150 MG/DL (ref 70–100)
HCT VFR BLD AUTO: 33.4 % (ref 42–52)
HGB BLD-MCNC: 10 G/DL (ref 13.5–17.5)
LYMPHOCYTES # BLD AUTO: 1.7 10^3/UL (ref 1.5–5)
LYMPHOCYTES NFR BLD AUTO: 18.8 % (ref 24–44)
MCH RBC QN AUTO: 21.2 PG (ref 27–33)
MCHC RBC AUTO-ENTMCNC: 29.9 G/DL (ref 32–36.5)
MCV RBC AUTO: 70.8 FL (ref 80–96)
MONOCYTES # BLD AUTO: 1.1 10^3/UL (ref 0–0.8)
MONOCYTES NFR BLD AUTO: 12 % (ref 2–8)
NEUTROPHILS # BLD AUTO: 5.6 10^3/UL (ref 1.5–8.5)
NEUTROPHILS NFR BLD AUTO: 62.6 % (ref 36–66)
PHOSPHATE SERPL-MCNC: 3.5 MG/DL (ref 2.5–4.9)
PLATELET # BLD AUTO: 339 10^3/UL (ref 150–450)
POTASSIUM SERPL-SCNC: 3.6 MEQ/L (ref 3.5–5.1)
RBC # BLD AUTO: 4.72 10^6/UL (ref 4.3–6.1)
SODIUM SERPL-SCNC: 134 MEQ/L (ref 136–145)
WBC # BLD AUTO: 9 10^3/UL (ref 4–10)

## 2021-06-13 RX ADMIN — INSULIN LISPRO SCH UNITS: 100 INJECTION, SOLUTION INTRAVENOUS; SUBCUTANEOUS at 17:03

## 2021-06-13 RX ADMIN — INSULIN DETEMIR SCH UNITS: 100 INJECTION, SOLUTION SUBCUTANEOUS at 20:49

## 2021-06-13 RX ADMIN — SODIUM CHLORIDE SCH UNITS: 4.5 INJECTION, SOLUTION INTRAVENOUS at 08:34

## 2021-06-13 RX ADMIN — LEVALBUTEROL HYDROCHLORIDE SCH MG: 1.25 SOLUTION, CONCENTRATE RESPIRATORY (INHALATION) at 07:29

## 2021-06-13 RX ADMIN — ONDANSETRON PRN MG: 2 INJECTION INTRAMUSCULAR; INTRAVENOUS at 15:19

## 2021-06-13 RX ADMIN — INSULIN LISPRO SCH UNITS: 100 INJECTION, SOLUTION INTRAVENOUS; SUBCUTANEOUS at 20:50

## 2021-06-13 RX ADMIN — INSULIN LISPRO SCH UNITS: 100 INJECTION, SOLUTION INTRAVENOUS; SUBCUTANEOUS at 12:13

## 2021-06-13 RX ADMIN — INSULIN LISPRO SCH UNITS: 100 INJECTION, SOLUTION INTRAVENOUS; SUBCUTANEOUS at 08:39

## 2021-06-13 RX ADMIN — LEVALBUTEROL HYDROCHLORIDE SCH MG: 1.25 SOLUTION, CONCENTRATE RESPIRATORY (INHALATION) at 01:22

## 2021-06-13 RX ADMIN — PANTOPRAZOLE SODIUM SCH MG: 40 TABLET, DELAYED RELEASE ORAL at 08:35

## 2021-06-13 RX ADMIN — CLOPIDOGREL BISULFATE SCH MG: 75 TABLET ORAL at 10:25

## 2021-06-13 RX ADMIN — RIVAROXABAN SCH MG: 15 TABLET, FILM COATED ORAL at 17:02

## 2021-06-13 RX ADMIN — LEVALBUTEROL HYDROCHLORIDE SCH MG: 1.25 SOLUTION, CONCENTRATE RESPIRATORY (INHALATION) at 13:13

## 2021-06-13 RX ADMIN — FUROSEMIDE SCH MG: 40 TABLET ORAL at 08:51

## 2021-06-13 RX ADMIN — PRAVASTATIN SODIUM SCH MG: 20 TABLET ORAL at 20:49

## 2021-06-13 RX ADMIN — DOCUSATE SODIUM SCH MG: 100 CAPSULE, LIQUID FILLED ORAL at 20:49

## 2021-06-13 RX ADMIN — TAMSULOSIN HYDROCHLORIDE SCH MG: 0.4 CAPSULE ORAL at 08:35

## 2021-06-13 RX ADMIN — LEVOTHYROXINE SODIUM SCH MCG: 50 TABLET ORAL at 06:21

## 2021-06-13 RX ADMIN — LEVALBUTEROL HYDROCHLORIDE SCH MG: 1.25 SOLUTION, CONCENTRATE RESPIRATORY (INHALATION) at 19:51

## 2021-06-13 RX ADMIN — ACETAMINOPHEN PRN MG: 325 TABLET ORAL at 08:49

## 2021-06-13 RX ADMIN — INSULIN DETEMIR SCH UNITS: 100 INJECTION, SOLUTION SUBCUTANEOUS at 08:34

## 2021-06-13 RX ADMIN — DOCUSATE SODIUM SCH MG: 100 CAPSULE, LIQUID FILLED ORAL at 08:35

## 2021-06-13 NOTE — IPNPDOC
Text Note


Date of Service


The patient was seen on 6/13/21.





NOTE


Hospitalist Progress Note





Subjective:


Although the patient was somnolent throughout much of the day yesterday, 

apparently he was up quite a bit of the night. Also it appears that he is 

feeling discouraged and perhaps depressed about his current situation, he 

indicated to her night nurse that he just wants to give up and die. When 

questioned about this morning fell, he simply states that he is tired, does not 

seem to want to broach the subject. Otherwise, he does not voice any additional 

complaints at this time.





Objective:


General: Awake, alert, oriented 3. Not in any acute distress.


HEENT: Head normocephalic, atraumatic, sclera are nonicteric. Hearing is grossly

intact to conversation. Tongue appears more moist today


Respiratory: Mild crackles noted at the left base, but otherwise remainder of 

the left and right lung fields are clear to auscultation today. Still has a 

dressing in place on the right thorax from chest tube that was removed 

yesterday.


Cardiovascular: Distant heart sounds, I cannot perceive any rubs, gallops, or 

murmur.


Abdomen: Soft, nontender, nondistended, no hepatosplenomegaly appreciated. Bowel

sounds present.


Extremities: 2+ pulses in the radial and dorsalis pedis bilaterally. No evidence

of clubbing or cyanosis. Status post right BKA. He does have a wound on the left

great toe. Once again, no pitting edema noted in his left lower extremity.





Assessment:


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 

ejection fraction of 25-30% and grade 2 diastolic dysfunction


Metabolic encephalopathy secondary to CO2 narcosis


Acute hypercarbic respiratory failure, resolved


Prior suspicion for pneumonia, it does not appear that he had this, antibiotics 

were discontinued


Chronic Atrial fibrillation


Peripheral vascular disease


Hypertension


Type 2 diabetes mellitus


Hypothyroidism


Left lower extremity wound


Benign prostatic hypertrophy





Plan:


- Once again appears euvolemic today, will continue with his home dose of Lasix 

40 mg daily.


-Will resume anticoagulation and antiplatelet medications for atrial 

fibrillation and peripheral vascular disease today


- I reviewed the xray, shows good aeration of the lungs, perhaps a small 

effusion on the left side (still waiting on official read)


- Otherwise, no changes in the remainder of his regimen regarding his 

hypertension, diabetes, hypothyroidism, and the remainder of his home 

medications.





VS,Fishbone, I+O


VS, Fishbone, I+O


Laboratory Tests


6/13/21 04:08











Vital Signs








  Date Time  Temp Pulse Resp B/P (MAP) Pulse Ox O2 Delivery O2 Flow Rate FiO2


 


6/13/21 08:35  67  147/67    


 


6/13/21 04:00 96.9  20  96 Nasal Cannula 1.0 


 


6/10/21 04:00        30














I&O- Last 24 Hours up to 6 AM 


 


 6/13/21





 06:00


 


Intake Total 680 ml


 


Output Total 2000 ml


 


Balance -1320 ml

















BELKIS GONZALEZ DO               Jun 13, 2021 08:42

## 2021-06-13 NOTE — REP
INDICATION:

pleural effusion.



COMPARISON:

Comparison chest x-ray June 12, 2021.



TECHNIQUE:

Two views..



FINDINGS:

Sitting AP and lateral views demonstrate only very slight blunting of the posterior

pleural angles on the lateral radiograph.  This is similar to yesterday's radiograph.

The lateral pleural angles are sharp.  The diaphragms are not obscured on either side.

No focal infiltrate is seen.  Cardiomegaly is again observed.  The thoracic aorta is

calcific and somewhat tortuous.  There is diffuse osteopenia overall, there is mild

hyperinflation.



In the interval since yesterday's radiograph, the right chest tube is been removed.

There is a tiny collection of apical pleural air on the right.



IMPRESSION:

Hyperinflation.  Very slightly blunted posterior pleural angles.  Status post right

chest tube removal.  Tiny right apical pneumothorax..





<Electronically signed by James Loredo > 06/13/21 [id number]

## 2021-06-14 VITALS — SYSTOLIC BLOOD PRESSURE: 129 MMHG | DIASTOLIC BLOOD PRESSURE: 62 MMHG

## 2021-06-14 VITALS — DIASTOLIC BLOOD PRESSURE: 62 MMHG | SYSTOLIC BLOOD PRESSURE: 131 MMHG

## 2021-06-14 VITALS — SYSTOLIC BLOOD PRESSURE: 128 MMHG | DIASTOLIC BLOOD PRESSURE: 62 MMHG

## 2021-06-14 LAB
BASOPHILS # BLD AUTO: 0.1 10^3/UL (ref 0–0.2)
BASOPHILS NFR BLD AUTO: 0.7 % (ref 0–1)
BUN SERPL-MCNC: 44 MG/DL (ref 7–18)
CALCIUM SERPL-MCNC: 8.1 MG/DL (ref 8.8–10.2)
CHLORIDE SERPL-SCNC: 88 MEQ/L (ref 98–107)
CO2 SERPL-SCNC: 38 MEQ/L (ref 21–32)
CREAT SERPL-MCNC: 1.49 MG/DL (ref 0.7–1.3)
EOSINOPHIL # BLD AUTO: 0.5 10^3/UL (ref 0–0.5)
EOSINOPHIL NFR BLD AUTO: 6.4 % (ref 0–3)
GFR SERPL CREATININE-BSD FRML MDRD: 47.3 ML/MIN/{1.73_M2} (ref 35–?)
GLUCOSE SERPL-MCNC: 198 MG/DL (ref 70–100)
HCT VFR BLD AUTO: 32.3 % (ref 42–52)
HGB BLD-MCNC: 9.8 G/DL (ref 13.5–17.5)
LYMPHOCYTES # BLD AUTO: 1.2 10^3/UL (ref 1.5–5)
LYMPHOCYTES NFR BLD AUTO: 15.9 % (ref 24–44)
MCH RBC QN AUTO: 21.3 PG (ref 27–33)
MCHC RBC AUTO-ENTMCNC: 30.3 G/DL (ref 32–36.5)
MCV RBC AUTO: 70.2 FL (ref 80–96)
MONOCYTES # BLD AUTO: 0.9 10^3/UL (ref 0–0.8)
MONOCYTES NFR BLD AUTO: 11.7 % (ref 2–8)
NEUTROPHILS # BLD AUTO: 4.9 10^3/UL (ref 1.5–8.5)
NEUTROPHILS NFR BLD AUTO: 64.8 % (ref 36–66)
PLATELET # BLD AUTO: 300 10^3/UL (ref 150–450)
POTASSIUM SERPL-SCNC: 3.4 MEQ/L (ref 3.5–5.1)
RBC # BLD AUTO: 4.6 10^6/UL (ref 4.3–6.1)
SODIUM SERPL-SCNC: 131 MEQ/L (ref 136–145)
WBC # BLD AUTO: 7.5 10^3/UL (ref 4–10)

## 2021-06-14 RX ADMIN — INSULIN LISPRO SCH UNITS: 100 INJECTION, SOLUTION INTRAVENOUS; SUBCUTANEOUS at 08:06

## 2021-06-14 RX ADMIN — LEVALBUTEROL HYDROCHLORIDE SCH MG: 1.25 SOLUTION, CONCENTRATE RESPIRATORY (INHALATION) at 07:23

## 2021-06-14 RX ADMIN — RIVAROXABAN SCH MG: 15 TABLET, FILM COATED ORAL at 17:46

## 2021-06-14 RX ADMIN — INSULIN LISPRO SCH UNITS: 100 INJECTION, SOLUTION INTRAVENOUS; SUBCUTANEOUS at 12:07

## 2021-06-14 RX ADMIN — CLOPIDOGREL BISULFATE SCH MG: 75 TABLET ORAL at 08:07

## 2021-06-14 RX ADMIN — LEVOTHYROXINE SODIUM SCH MCG: 50 TABLET ORAL at 05:38

## 2021-06-14 RX ADMIN — INSULIN LISPRO SCH UNITS: 100 INJECTION, SOLUTION INTRAVENOUS; SUBCUTANEOUS at 21:00

## 2021-06-14 RX ADMIN — INSULIN DETEMIR SCH UNITS: 100 INJECTION, SOLUTION SUBCUTANEOUS at 21:50

## 2021-06-14 RX ADMIN — PRAVASTATIN SODIUM SCH MG: 20 TABLET ORAL at 21:49

## 2021-06-14 RX ADMIN — DOCUSATE SODIUM SCH MG: 100 CAPSULE, LIQUID FILLED ORAL at 21:49

## 2021-06-14 RX ADMIN — LEVALBUTEROL HYDROCHLORIDE SCH MG: 1.25 SOLUTION, CONCENTRATE RESPIRATORY (INHALATION) at 20:00

## 2021-06-14 RX ADMIN — FUROSEMIDE SCH MG: 40 TABLET ORAL at 08:07

## 2021-06-14 RX ADMIN — PANTOPRAZOLE SODIUM SCH MG: 40 TABLET, DELAYED RELEASE ORAL at 08:06

## 2021-06-14 RX ADMIN — DOCUSATE SODIUM SCH MG: 100 CAPSULE, LIQUID FILLED ORAL at 08:07

## 2021-06-14 RX ADMIN — INSULIN DETEMIR SCH UNITS: 100 INJECTION, SOLUTION SUBCUTANEOUS at 08:06

## 2021-06-14 RX ADMIN — LEVALBUTEROL HYDROCHLORIDE SCH MG: 1.25 SOLUTION, CONCENTRATE RESPIRATORY (INHALATION) at 13:14

## 2021-06-14 RX ADMIN — TAMSULOSIN HYDROCHLORIDE SCH MG: 0.4 CAPSULE ORAL at 08:07

## 2021-06-14 RX ADMIN — ONDANSETRON PRN MG: 2 INJECTION INTRAMUSCULAR; INTRAVENOUS at 15:58

## 2021-06-14 RX ADMIN — LEVALBUTEROL HYDROCHLORIDE SCH MG: 1.25 SOLUTION, CONCENTRATE RESPIRATORY (INHALATION) at 00:39

## 2021-06-14 RX ADMIN — INSULIN LISPRO SCH UNITS: 100 INJECTION, SOLUTION INTRAVENOUS; SUBCUTANEOUS at 17:46

## 2021-06-14 NOTE — IPNPDOC
Text Note


Date of Service


The patient was seen on 6/14/21.





NOTE


Hospitalist Progress Note





Subjective:


The patient reports that he is feeling well today. He does have a small mild 

nonproductive cough, but otherwise has no complaints. He will continue to work 

with PT regarding rehabilitation. He does not have much pain at the site of the 

prior chest tube. Remainder of his review systems is negative.





Objective:


General: Awake, alert, oriented 3. Not in any acute distress.


HEENT: Head normocephalic, atraumatic, sclera are nonicteric. Hearing is grossly

intact to conversation. Tongue appears more moist today


Respiratory: Once again has mild crackles noted at the left base, but otherwise 

remainder of the left and right lung fields are clear to auscultation today.


Cardiovascular: Distant heart sounds, I cannot perceive any rubs, gallops, or 

murmur.


Abdomen: Soft, nontender, nondistended, no hepatosplenomegaly appreciated. Bowel

sounds present.


Extremities: 2+ pulses in the radial and dorsalis pedis bilaterally. No evidence

of clubbing or cyanosis. Status post right BKA. He does have a wound on the left

great toe. Once again, no pitting edema noted in his left lower extremity.





Assessment:


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 

ejection fraction of 25-30% and grade 2 diastolic dysfunction


Metabolic encephalopathy secondary to CO2 narcosis


Acute hypercarbic respiratory failure, resolved


Prior suspicion for pneumonia, it does not appear that he had this, antibiotics 

were discontinued


Chronic Atrial fibrillation


Peripheral vascular disease


Hypertension


Type 2 diabetes mellitus


Hypothyroidism


Left lower extremity wound


Benign prostatic hypertrophy





Plan:


- On clinical exam he does appear to be euvolemic, however he still does have 

some mild crackles in the left lung base, and he had an net positive fluid 

balance of approximately 500 mL. I will increase his daily dose of Lasix from 40

mg to 60 mg today and we will continue to follow.


- Otherwise, no changes in the remainder of his regimen regarding his hypertensi

on, diabetes, hypothyroidism, and the remainder of his home medications.


- PA and lateral chest x-ray was obtained today, and he still has good aeration 

of both lungs, and it appears that perhaps even the small left-sided pleural 

effusion is improving, still awaiting official read.





VS,Fishbone, I+O


VS, Fishbone, I+O


Laboratory Tests


6/14/21 06:30











Vital Signs








  Date Time  Temp Pulse Resp B/P (MAP) Pulse Ox O2 Delivery O2 Flow Rate FiO2


 


6/14/21 08:10  72  115/53    


 


6/14/21 05:57 98.4  18  93 Room Air  


 


6/13/21 15:05       1.0 


 


6/10/21 04:00        30














I&O- Last 24 Hours up to 6 AM 


 


 6/14/21





 05:59


 


Intake Total 2330 ml


 


Output Total 1765 ml


 


Balance 565 ml

















BELKIS GONZALEZ 14, 2021 10:54

## 2021-06-14 NOTE — REP
INDICATION:

CHF



COMPARISON:

06/13/2021



TECHNIQUE:

PA and lateral.



FINDINGS:

The mediastinum and cardiac silhouette are stable.  The lung fields demonstrate

chronic appearing interstitial changes.  Trace left basilar atelectasis and possible

small pleural effusions again noted and unchanged.  The skeletal structures are intact

and normal.



IMPRESSION:

1.  No significant change from prior examination.  Small pleural effusions and left

basilar atelectasis best identified on lateral radiograph again noted.

2.  No new acute process.





<Electronically signed by Boby Santillan > 06/14/21 0913

## 2021-06-14 NOTE — IPN
PROGRESS NOTE



DATE:  06/13/2021



SUBJECTIVE: Mr. Burdick is much more awake today.  He is doing well and his

pain is being well controlled at the chest tube insertion site.  The next step

in his recovery is ambulation and getting him up and around. 



OBJECTIVE:  

His vital signs show a T-max of 97.3 with a heart rate that ranges between 66

and 72 in atrial fibrillation with a respiratory rate of 16-20 without the use

of accessory muscles who is 90-97% saturated on 1 liter nasal cannula. His

blood pressure is ranging between 147/67 to 116/56.

His intake and output the past 24 hours has been recorded as 1040 in and 2440

out for a negativity of 1400 mL.  His chest tube was removed yesterday.  Weight

today is 77.2 kg compared to 72.5 kg yesterday. 



On physical examination I still hear some faint inspiratory rales towards the

end of inspiration in the right hemithorax.  Right side shows normal vesicular

sounds.  Percussion notes are full through the diaphragm.

Cardiac exam is without murmurs, clicks, gallops, or rubs. I cannot feel his

PMI. S1, S2 are normal. 

Abdomen is soft, nontender. Bowel sounds are positive. There is no

hepatomegaly. No CVA tenderness. 

Extremities show no pretibial edema, no calf tenderness, no differential

swelling of the upper extremities. 

Skin is warm and dry and perfused without cyanosis or mottling including that

of nailbeds and knees. 

Neck is supple. There is no jugular venous distention, no subcutaneous

emphysema.  Trachea is midline. 

Mouth shows the mucous membranes to be pink and moist. Lips and gums show no

lesions, no thrush. 

Eyes show his pupils to be equal, round, and reactive. Extraocular movements

are intact. Sclerae are nonicteric.  

Neuro shows II-XII intact with normal gross motor, gross sensation intact. Gait

is not tested.  

Psychiatric shows him to be awake and alert and oriented times three with

appropriate mood and affect and conversational. 



His white count today is 9.0 with a hemoglobin and hematocrit of 10.0 and 33.4,

unchanged from yesterday with a platelet count of 339 and stable.  Differential

shows 60% neutrophils, 18% lymphocytes, 12% monocytes. There were no immature

forms or toxic granulations. 



His chemistries today show a sodium of 134 with a potassium of 3.6.  His total

CO2 is better today at 44 than it was yesterday at 50.  BUN and creatinine are

48 and 1.32, slightly better than yesterday.  Glucose is 150 with a calcium of

8.7 and a phosphorus of 3.5.  His blood gasses yesterday showed a pH of 7.52

with a pCO2 of 61, a pO2 of 84 and a base excess of 22.6.  This is improved

from his prior blood gasses where he ran pCO2s between 99 and 81.  



His total CO2 is improving.  



His chest x-ray today shows his lungs fully expanded to the chest wall. There

is a small rim of an airspace maybe 2 mm at the top of the lung on the right

side.  Right costophrenic is every so minimally blunted.  Left costophrenic

angle is sharp. CT ratio is normal. Lateral film does not show any infiltrates

although he looks as though he still has a little bit of compression in the

right lower lobe.  



ASSESSMENT:  

1.  Large right-sided pleural effusion, resolved with a chest tube. 

2.  Respiratory failure, resolved.

3.  Hypercapnia, improving.

4.  Severe systolic dysfunction.

5.  Severe peripheral vascular disease. 

6.  Necrotic right toe.

7.  CO2 narcosis, resolving.

8.  Diabetes. 

9.  Hypertension. 

10.  Hypothyroidism. 

11.  Gastroesophageal reflux disease. 

12.  Atrial fibrillation. 

13.  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



PLAN AND DISCUSSION:  His pleural effusion is now almost completely resolved. 

He is being diuresed.  The next step in his recovery is ambulation. I have

asked nursing staff to at least get him to a chair today and to have physical

therapy work with him as he is an amputee.  His necrotic great toe is being

followed by the wound service. It is dry at this point in time.  As his pleural

effusion is resolving, I will withdraw from the case to be available for

followup should that be necessary. We will remove his chest tube dressing today.

## 2021-06-15 VITALS — SYSTOLIC BLOOD PRESSURE: 129 MMHG | DIASTOLIC BLOOD PRESSURE: 60 MMHG

## 2021-06-15 VITALS — DIASTOLIC BLOOD PRESSURE: 59 MMHG | SYSTOLIC BLOOD PRESSURE: 129 MMHG

## 2021-06-15 VITALS — SYSTOLIC BLOOD PRESSURE: 129 MMHG | DIASTOLIC BLOOD PRESSURE: 84 MMHG

## 2021-06-15 RX ADMIN — TAMSULOSIN HYDROCHLORIDE SCH MG: 0.4 CAPSULE ORAL at 09:02

## 2021-06-15 RX ADMIN — LEVOTHYROXINE SODIUM SCH MCG: 50 TABLET ORAL at 06:30

## 2021-06-15 RX ADMIN — LEVALBUTEROL HYDROCHLORIDE SCH MG: 1.25 SOLUTION, CONCENTRATE RESPIRATORY (INHALATION) at 07:14

## 2021-06-15 RX ADMIN — INSULIN LISPRO SCH UNITS: 100 INJECTION, SOLUTION INTRAVENOUS; SUBCUTANEOUS at 12:52

## 2021-06-15 RX ADMIN — CLOPIDOGREL BISULFATE SCH MG: 75 TABLET ORAL at 09:02

## 2021-06-15 RX ADMIN — INSULIN LISPRO SCH UNITS: 100 INJECTION, SOLUTION INTRAVENOUS; SUBCUTANEOUS at 21:00

## 2021-06-15 RX ADMIN — INSULIN DETEMIR SCH UNITS: 100 INJECTION, SOLUTION SUBCUTANEOUS at 09:00

## 2021-06-15 RX ADMIN — INSULIN LISPRO SCH UNITS: 100 INJECTION, SOLUTION INTRAVENOUS; SUBCUTANEOUS at 09:00

## 2021-06-15 RX ADMIN — LEVALBUTEROL HYDROCHLORIDE SCH MG: 1.25 SOLUTION, CONCENTRATE RESPIRATORY (INHALATION) at 13:04

## 2021-06-15 RX ADMIN — LEVALBUTEROL HYDROCHLORIDE SCH MG: 1.25 SOLUTION, CONCENTRATE RESPIRATORY (INHALATION) at 02:50

## 2021-06-15 RX ADMIN — DOCUSATE SODIUM SCH MG: 100 CAPSULE, LIQUID FILLED ORAL at 09:03

## 2021-06-15 RX ADMIN — DOCUSATE SODIUM SCH MG: 100 CAPSULE, LIQUID FILLED ORAL at 21:00

## 2021-06-15 RX ADMIN — INSULIN LISPRO SCH UNITS: 100 INJECTION, SOLUTION INTRAVENOUS; SUBCUTANEOUS at 18:42

## 2021-06-15 RX ADMIN — PANTOPRAZOLE SODIUM SCH MG: 40 TABLET, DELAYED RELEASE ORAL at 09:02

## 2021-06-15 RX ADMIN — LEVALBUTEROL HYDROCHLORIDE SCH MG: 1.25 SOLUTION, CONCENTRATE RESPIRATORY (INHALATION) at 20:00

## 2021-06-15 NOTE — REP
INDICATION:

pleural effusion



COMPARISON:

Multiple prior examinations dating through 06/10/2021



TECHNIQUE:

PA and lateral.



FINDINGS:

The mediastinum and cardiac silhouette are stable and within normal limits.  The lung

fields demonstrate chronic interstitial changes without acute consolidation, effusion,

or pneumothorax.  Previously noted effusions have resolved.  The skeletal structures

are intact and normal.



IMPRESSION:

No acute cardiopulmonary process.  Previous effusions resolved.





<Electronically signed by Boby Santillan > 06/15/21 [id number]

## 2021-06-15 NOTE — IPNPDOC
Text Note


Date of Service


The patient was seen on 6/15/21.





NOTE


Subjective:


Patient was seen and examined this morning at bedside. Patient tells me he is 

extremely tired he feels exhausted he doesn't want to continue to pursue medical

management anymore. He tells me he just wants to pass away peacefully and be 

left alone. I discussed with him the possibility of doing rehabilitation and he 

shook his head adamantly refused said he is not going to go anywhere he just 

wants to go home and pass away when his time comes. He tells me he is not in any

pain but he hasn't been able to sleep. There is no acute overnight events 

reported to me.





Objective:


Constitutional: Awake, appears very weak, frail


ENT: Sclera are clear. Mucosa is moist. 


Respiratory:  Lungs diminished bilaterally without any crackles appreciated.  No

respiratory distress. 


Cardiovascular: Heart sounds are distant but no murmurs are appreciated


Gastrointestinal: Abdomen is soft, non distended, non tender, BS present. 


Musculoskeletal: Status post right below knee amputation. Left leg no pitting 

edema. 


Mental Status: A&O x3





Assessment:


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 

ejection fraction of 25-30% and grade 2 diastolic dysfunction


Metabolic encephalopathy secondary to CO2 narcosis


Acute hypercarbic respiratory failure, resolved


Prior suspicion for pneumonia, it does not appear that he had this, antibiotics 

were discontinued


Chronic Atrial fibrillation


Peripheral vascular disease


Hypertension


Type 2 diabetes mellitus


Hypothyroidism


Left lower extremity wound


Benign prostatic hypertrophy





plan:





Regarding patient's acute decompensated heart failure he is now euvolemic and 

continue on oral diuresis.





We will continue his medications for chronic medical problems.





Initially I requested an ARU screen based on recommendations by physical therapy

however shortly after seeing the patient myself he is not a candidate for acute 

rehabilitation he will not be able to tolerate their requirements in addition he

adamantly refused to do any kind of rehabilitation. He his more interested in 

comfort measures only. 





At this point he is cleared from a medical standpoint however he is weak and 

deconditioned and would benefit from subacute rehabilitation although he 

refuses. I discussed this topic extensively with him today, he talked to me 

about being interested in comfort measures only but wanted me to discuss this 

further with his daughter Lori. He tells me is his healthcare proxy. I tried to 

call her today but was unable to reach her. Patient was switched to [facility] status.





A Yousef 


Hospitalist 





Addendum: in the evening had a long discussion with his daughter Lori, his other 

daughter over speakerphone and the patient and he descided he wants to be made 

CMO. He gave verbal consent for molst form and daughter signed. Will consult 

hospice for home vs hospice house.





VS,Fishbone, I+O


VS, Fishbone, I+O





Vital Signs








  Date Time  Temp Pulse Resp B/P (MAP) Pulse Ox O2 Delivery O2 Flow Rate FiO2


 


6/15/21 09:03  67  129/59    


 


6/15/21 06:27 98.2  14  96 Room Air  


 


6/13/21 15:05       1.0 


 


6/10/21 04:00        30














I&O- Last 24 Hours up to 6 AM 


 


 6/15/21





 06:00


 


Intake Total 710 ml


 


Output Total 375 ml


 


Balance 335 ml

















REJI SCHMIDT MD              Hubert 15, 2021 11:45

## 2021-06-16 RX ADMIN — LEVALBUTEROL HYDROCHLORIDE SCH MG: 1.25 SOLUTION, CONCENTRATE RESPIRATORY (INHALATION) at 02:00

## 2021-06-16 RX ADMIN — INSULIN LISPRO SCH UNITS: 100 INJECTION, SOLUTION INTRAVENOUS; SUBCUTANEOUS at 20:04

## 2021-06-16 RX ADMIN — LEVALBUTEROL HYDROCHLORIDE SCH MG: 1.25 SOLUTION, CONCENTRATE RESPIRATORY (INHALATION) at 07:32

## 2021-06-16 RX ADMIN — LEVALBUTEROL HYDROCHLORIDE SCH MG: 1.25 SOLUTION, CONCENTRATE RESPIRATORY (INHALATION) at 20:00

## 2021-06-16 RX ADMIN — DOCUSATE SODIUM SCH MG: 100 CAPSULE, LIQUID FILLED ORAL at 09:00

## 2021-06-16 RX ADMIN — DOCUSATE SODIUM SCH MG: 100 CAPSULE, LIQUID FILLED ORAL at 20:04

## 2021-06-16 RX ADMIN — INSULIN LISPRO SCH UNITS: 100 INJECTION, SOLUTION INTRAVENOUS; SUBCUTANEOUS at 07:30

## 2021-06-16 RX ADMIN — INSULIN LISPRO SCH UNITS: 100 INJECTION, SOLUTION INTRAVENOUS; SUBCUTANEOUS at 12:00

## 2021-06-16 RX ADMIN — LEVALBUTEROL HYDROCHLORIDE SCH MG: 1.25 SOLUTION, CONCENTRATE RESPIRATORY (INHALATION) at 13:35

## 2021-06-16 RX ADMIN — PANTOPRAZOLE SODIUM SCH MG: 40 TABLET, DELAYED RELEASE ORAL at 09:00

## 2021-06-16 RX ADMIN — TAMSULOSIN HYDROCHLORIDE SCH MG: 0.4 CAPSULE ORAL at 09:00

## 2021-06-16 RX ADMIN — INSULIN LISPRO SCH UNITS: 100 INJECTION, SOLUTION INTRAVENOUS; SUBCUTANEOUS at 17:30

## 2021-06-17 RX ADMIN — INSULIN LISPRO SCH UNITS: 100 INJECTION, SOLUTION INTRAVENOUS; SUBCUTANEOUS at 21:00

## 2021-06-17 RX ADMIN — LEVALBUTEROL HYDROCHLORIDE SCH MG: 1.25 SOLUTION, CONCENTRATE RESPIRATORY (INHALATION) at 01:56

## 2021-06-17 RX ADMIN — ACETAMINOPHEN PRN MG: 325 TABLET ORAL at 17:50

## 2021-06-17 RX ADMIN — LEVALBUTEROL HYDROCHLORIDE SCH MG: 1.25 SOLUTION, CONCENTRATE RESPIRATORY (INHALATION) at 08:00

## 2021-06-17 RX ADMIN — INSULIN LISPRO SCH UNITS: 100 INJECTION, SOLUTION INTRAVENOUS; SUBCUTANEOUS at 07:30

## 2021-06-17 RX ADMIN — PANTOPRAZOLE SODIUM SCH MG: 40 TABLET, DELAYED RELEASE ORAL at 09:00

## 2021-06-17 RX ADMIN — LEVALBUTEROL HYDROCHLORIDE SCH MG: 1.25 SOLUTION, CONCENTRATE RESPIRATORY (INHALATION) at 19:47

## 2021-06-17 RX ADMIN — INSULIN LISPRO SCH UNITS: 100 INJECTION, SOLUTION INTRAVENOUS; SUBCUTANEOUS at 12:00

## 2021-06-17 RX ADMIN — TAMSULOSIN HYDROCHLORIDE SCH MG: 0.4 CAPSULE ORAL at 09:00

## 2021-06-17 RX ADMIN — INSULIN LISPRO SCH UNITS: 100 INJECTION, SOLUTION INTRAVENOUS; SUBCUTANEOUS at 17:30

## 2021-06-17 RX ADMIN — DOCUSATE SODIUM SCH MG: 100 CAPSULE, LIQUID FILLED ORAL at 09:00

## 2021-06-17 RX ADMIN — DOCUSATE SODIUM SCH MG: 100 CAPSULE, LIQUID FILLED ORAL at 21:00

## 2021-06-18 RX ADMIN — INSULIN LISPRO SCH UNITS: 100 INJECTION, SOLUTION INTRAVENOUS; SUBCUTANEOUS at 07:30

## 2021-06-18 RX ADMIN — LEVALBUTEROL HYDROCHLORIDE SCH MG: 1.25 SOLUTION, CONCENTRATE RESPIRATORY (INHALATION) at 08:00

## 2021-06-18 RX ADMIN — LEVALBUTEROL HYDROCHLORIDE SCH MG: 1.25 SOLUTION, CONCENTRATE RESPIRATORY (INHALATION) at 02:00

## 2021-06-18 RX ADMIN — ACETAMINOPHEN PRN MG: 325 TABLET ORAL at 09:33

## 2021-06-18 RX ADMIN — INSULIN LISPRO SCH UNITS: 100 INJECTION, SOLUTION INTRAVENOUS; SUBCUTANEOUS at 12:00

## 2021-06-18 RX ADMIN — INSULIN LISPRO SCH UNITS: 100 INJECTION, SOLUTION INTRAVENOUS; SUBCUTANEOUS at 22:49

## 2021-06-18 RX ADMIN — TAMSULOSIN HYDROCHLORIDE SCH MG: 0.4 CAPSULE ORAL at 09:00

## 2021-06-18 RX ADMIN — DOCUSATE SODIUM SCH MG: 100 CAPSULE, LIQUID FILLED ORAL at 21:00

## 2021-06-18 RX ADMIN — PANTOPRAZOLE SODIUM SCH MG: 40 TABLET, DELAYED RELEASE ORAL at 09:00

## 2021-06-18 RX ADMIN — LEVALBUTEROL HYDROCHLORIDE SCH MG: 1.25 SOLUTION, CONCENTRATE RESPIRATORY (INHALATION) at 20:00

## 2021-06-18 RX ADMIN — DOCUSATE SODIUM SCH MG: 100 CAPSULE, LIQUID FILLED ORAL at 09:00

## 2021-06-19 RX ADMIN — LEVALBUTEROL HYDROCHLORIDE SCH MG: 1.25 SOLUTION, CONCENTRATE RESPIRATORY (INHALATION) at 08:00

## 2021-06-19 RX ADMIN — PANTOPRAZOLE SODIUM SCH MG: 40 TABLET, DELAYED RELEASE ORAL at 08:28

## 2021-06-19 RX ADMIN — TAMSULOSIN HYDROCHLORIDE SCH MG: 0.4 CAPSULE ORAL at 08:28

## 2021-06-19 RX ADMIN — DOCUSATE SODIUM SCH MG: 100 CAPSULE, LIQUID FILLED ORAL at 21:10

## 2021-06-19 RX ADMIN — INSULIN LISPRO SCH UNITS: 100 INJECTION, SOLUTION INTRAVENOUS; SUBCUTANEOUS at 12:00

## 2021-06-19 RX ADMIN — DOCUSATE SODIUM SCH MG: 100 CAPSULE, LIQUID FILLED ORAL at 08:28

## 2021-06-19 RX ADMIN — INSULIN LISPRO SCH UNITS: 100 INJECTION, SOLUTION INTRAVENOUS; SUBCUTANEOUS at 21:00

## 2021-06-19 RX ADMIN — LEVALBUTEROL HYDROCHLORIDE SCH MG: 1.25 SOLUTION, CONCENTRATE RESPIRATORY (INHALATION) at 14:00

## 2021-06-19 RX ADMIN — INSULIN LISPRO SCH UNITS: 100 INJECTION, SOLUTION INTRAVENOUS; SUBCUTANEOUS at 16:57

## 2021-06-19 RX ADMIN — INSULIN LISPRO SCH UNITS: 100 INJECTION, SOLUTION INTRAVENOUS; SUBCUTANEOUS at 07:30

## 2021-06-19 RX ADMIN — LEVALBUTEROL HYDROCHLORIDE SCH MG: 1.25 SOLUTION, CONCENTRATE RESPIRATORY (INHALATION) at 20:00

## 2021-06-19 RX ADMIN — LEVALBUTEROL HYDROCHLORIDE SCH MG: 1.25 SOLUTION, CONCENTRATE RESPIRATORY (INHALATION) at 02:00

## 2021-06-20 VITALS — SYSTOLIC BLOOD PRESSURE: 128 MMHG | DIASTOLIC BLOOD PRESSURE: 61 MMHG

## 2021-06-20 RX ADMIN — LEVALBUTEROL HYDROCHLORIDE SCH MG: 1.25 SOLUTION, CONCENTRATE RESPIRATORY (INHALATION) at 20:00

## 2021-06-20 RX ADMIN — INSULIN LISPRO SCH UNITS: 100 INJECTION, SOLUTION INTRAVENOUS; SUBCUTANEOUS at 07:30

## 2021-06-20 RX ADMIN — LEVALBUTEROL HYDROCHLORIDE SCH MG: 1.25 SOLUTION, CONCENTRATE RESPIRATORY (INHALATION) at 02:00

## 2021-06-20 RX ADMIN — INSULIN LISPRO SCH UNITS: 100 INJECTION, SOLUTION INTRAVENOUS; SUBCUTANEOUS at 17:02

## 2021-06-20 RX ADMIN — LEVALBUTEROL HYDROCHLORIDE SCH MG: 1.25 SOLUTION, CONCENTRATE RESPIRATORY (INHALATION) at 08:00

## 2021-06-20 RX ADMIN — PANTOPRAZOLE SODIUM SCH MG: 40 TABLET, DELAYED RELEASE ORAL at 09:00

## 2021-06-20 RX ADMIN — LEVALBUTEROL HYDROCHLORIDE SCH MG: 1.25 SOLUTION, CONCENTRATE RESPIRATORY (INHALATION) at 14:00

## 2021-06-20 RX ADMIN — INSULIN LISPRO SCH UNITS: 100 INJECTION, SOLUTION INTRAVENOUS; SUBCUTANEOUS at 20:31

## 2021-06-20 RX ADMIN — DOCUSATE SODIUM SCH MG: 100 CAPSULE, LIQUID FILLED ORAL at 20:23

## 2021-06-20 RX ADMIN — INSULIN LISPRO SCH UNITS: 100 INJECTION, SOLUTION INTRAVENOUS; SUBCUTANEOUS at 12:00

## 2021-06-20 RX ADMIN — TAMSULOSIN HYDROCHLORIDE SCH MG: 0.4 CAPSULE ORAL at 09:00

## 2021-06-20 RX ADMIN — DOCUSATE SODIUM SCH MG: 100 CAPSULE, LIQUID FILLED ORAL at 09:00

## 2021-06-21 RX ADMIN — LEVALBUTEROL HYDROCHLORIDE SCH MG: 1.25 SOLUTION, CONCENTRATE RESPIRATORY (INHALATION) at 01:01

## 2021-06-21 RX ADMIN — TAMSULOSIN HYDROCHLORIDE SCH MG: 0.4 CAPSULE ORAL at 09:00

## 2021-06-21 RX ADMIN — LEVALBUTEROL HYDROCHLORIDE SCH MG: 1.25 SOLUTION, CONCENTRATE RESPIRATORY (INHALATION) at 14:00

## 2021-06-21 RX ADMIN — INSULIN LISPRO SCH UNITS: 100 INJECTION, SOLUTION INTRAVENOUS; SUBCUTANEOUS at 07:30

## 2021-06-21 RX ADMIN — DOCUSATE SODIUM SCH MG: 100 CAPSULE, LIQUID FILLED ORAL at 09:00

## 2021-06-21 RX ADMIN — DOCUSATE SODIUM SCH MG: 100 CAPSULE, LIQUID FILLED ORAL at 20:28

## 2021-06-21 RX ADMIN — INSULIN LISPRO SCH UNITS: 100 INJECTION, SOLUTION INTRAVENOUS; SUBCUTANEOUS at 12:00

## 2021-06-21 RX ADMIN — LEVALBUTEROL HYDROCHLORIDE SCH MG: 1.25 SOLUTION, CONCENTRATE RESPIRATORY (INHALATION) at 08:00

## 2021-06-21 RX ADMIN — PANTOPRAZOLE SODIUM SCH MG: 40 TABLET, DELAYED RELEASE ORAL at 09:00

## 2021-06-21 RX ADMIN — INSULIN LISPRO SCH UNITS: 100 INJECTION, SOLUTION INTRAVENOUS; SUBCUTANEOUS at 16:44

## 2021-06-21 RX ADMIN — LEVALBUTEROL HYDROCHLORIDE SCH MG: 1.25 SOLUTION, CONCENTRATE RESPIRATORY (INHALATION) at 20:00

## 2021-06-21 RX ADMIN — INSULIN LISPRO SCH UNITS: 100 INJECTION, SOLUTION INTRAVENOUS; SUBCUTANEOUS at 20:28

## 2021-06-22 RX ADMIN — LEVALBUTEROL HYDROCHLORIDE SCH MG: 1.25 SOLUTION, CONCENTRATE RESPIRATORY (INHALATION) at 19:16

## 2021-06-22 RX ADMIN — LEVALBUTEROL HYDROCHLORIDE SCH MG: 1.25 SOLUTION, CONCENTRATE RESPIRATORY (INHALATION) at 13:05

## 2021-06-22 RX ADMIN — MAGNESIUM HYDROXIDE PRN ML: 400 SUSPENSION ORAL at 23:30

## 2021-06-22 RX ADMIN — PANTOPRAZOLE SODIUM SCH MG: 40 TABLET, DELAYED RELEASE ORAL at 08:38

## 2021-06-22 RX ADMIN — INSULIN LISPRO SCH UNITS: 100 INJECTION, SOLUTION INTRAVENOUS; SUBCUTANEOUS at 07:30

## 2021-06-22 RX ADMIN — TAMSULOSIN HYDROCHLORIDE SCH MG: 0.4 CAPSULE ORAL at 08:38

## 2021-06-22 RX ADMIN — LEVALBUTEROL HYDROCHLORIDE SCH MG: 1.25 SOLUTION, CONCENTRATE RESPIRATORY (INHALATION) at 01:04

## 2021-06-22 RX ADMIN — LEVALBUTEROL HYDROCHLORIDE SCH MG: 1.25 SOLUTION, CONCENTRATE RESPIRATORY (INHALATION) at 07:13

## 2021-06-22 RX ADMIN — DOCUSATE SODIUM SCH MG: 100 CAPSULE, LIQUID FILLED ORAL at 08:38

## 2021-06-22 RX ADMIN — SIMETHICONE PRN MG: 80 TABLET, CHEWABLE ORAL at 18:52

## 2021-06-23 RX ADMIN — LEVALBUTEROL HYDROCHLORIDE SCH MG: 1.25 SOLUTION, CONCENTRATE RESPIRATORY (INHALATION) at 02:00

## 2021-06-23 RX ADMIN — LEVALBUTEROL HYDROCHLORIDE SCH MG: 1.25 SOLUTION, CONCENTRATE RESPIRATORY (INHALATION) at 14:00

## 2021-06-23 RX ADMIN — SIMETHICONE PRN MG: 80 TABLET, CHEWABLE ORAL at 12:22

## 2021-06-23 RX ADMIN — SIMETHICONE PRN MG: 80 TABLET, CHEWABLE ORAL at 01:40

## 2021-06-23 RX ADMIN — LEVALBUTEROL HYDROCHLORIDE SCH MG: 1.25 SOLUTION, CONCENTRATE RESPIRATORY (INHALATION) at 07:49

## 2021-06-23 RX ADMIN — LEVALBUTEROL HYDROCHLORIDE SCH MG: 1.25 SOLUTION, CONCENTRATE RESPIRATORY (INHALATION) at 20:00

## 2021-06-24 RX ADMIN — LEVALBUTEROL HYDROCHLORIDE SCH MG: 1.25 SOLUTION, CONCENTRATE RESPIRATORY (INHALATION) at 08:00

## 2021-06-24 RX ADMIN — LEVALBUTEROL HYDROCHLORIDE SCH MG: 1.25 SOLUTION, CONCENTRATE RESPIRATORY (INHALATION) at 01:03

## 2021-06-24 RX ADMIN — LEVALBUTEROL HYDROCHLORIDE SCH MG: 1.25 SOLUTION, CONCENTRATE RESPIRATORY (INHALATION) at 13:26

## 2021-06-24 RX ADMIN — MORPHINE SULFATE ONE MG: 2 INJECTION, SOLUTION INTRAMUSCULAR; INTRAVENOUS at 09:55

## 2021-06-24 RX ADMIN — MORPHINE SULFATE ONE MG: 2 INJECTION, SOLUTION INTRAMUSCULAR; INTRAVENOUS at 10:47

## 2021-06-24 NOTE — DS.PDOC
Discharge Summary


General


Date of Admission


Jun 8, 2021 at 01:50


Date of Discharge


6/24/21


home w hospice


[facility] dnr dni





Discharge Summary


PROCEDURES PERFORMED DURING STAY: 


Chest tube placement by Dr. Elder Hu





CONSULTANT:


Thoracic surgeon Dr. Elder Hu


Wound care specialist Dr. Shoaib asencio





DISCHARGE DIAGNOSES:


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 

ejection fraction of 25-30% and grade 2 diastolic dysfunction


Metabolic encephalopathy secondary to CO2 narcosis


Acute hypercarbic respiratory failure, resolved


Prior suspicion for pneumonia, it does not appear that he had this, antibiotics 

were discontinued


Chronic Atrial fibrillation


Peripheral vascular disease


Hypertension


Type 2 diabetes mellitus


Hypothyroidism


Left lower extremity wound


Benign prostatic hypertrophy


Large right-sided pleural effusion, resolved with a chest tube. 


 Severe peripheral vascular disease. 


 Necrotic right toe.





DISCHARGE MEDICATIONS: Please see below.


 


ALLERGIES: Please see below.





HOSPITAL COURSE:


89-year-old male history of hypertension, diabetes, peripheral vascular disease 

status post femoral-popliteal bypass, status post below knee right amputation, 

hypothyroidism, peripheral neuropathy, BPH, GERD, atrial fibrillation who 

presents because of a 3 week history of worsening shortness of breath admitted 

for large pleural effusion secondary to systolic congestive heart failure 

exacerbation.  Thoracic surgeon Dr. Elder Hu was consulted status post 

chest tube placement with resolution of the effusion but worsening dysfunction 

requiring continued diuresis which slight worsening in his renal failure.  His 

left big toe was evaluated via telemedicine by wound care specialist Dr. Godinez dressing changes were done.  Patient continued to have significant 

debility and has refused further care and management for his congestive heart 

failure he was made DO NOT RESUSCITATE DO NOT INTUBATE comfort measures only 

according to his wishes he is going home with hospice today.





PHYSICAL EXAMINATION ON DISCHARGE:


VITAL SIGNS: Please see below.


General: Awake, alert, oriented 3. Not in any acute distress.


HEENT: Moist mucous membranes positive JVD no thyromegaly


Respiratory: Diminished bilateral crackles


Cardiovascular: S1-S2 irregularly irregular


Abdomen: Soft, nontender,distended, no hepatosplenomegaly appreciated. Bowel 

sounds present.


Extremities: 2+ pulses in the radial and dorsalis pedis bilaterally. No evidence

of clubbing or cyanosis.


 Status post right BKA. He does have a wound on the left great toe.





LABORATORY DATA: Please see below.





IMAGING: See below





PROGNOSIS: Poor





ACTIVITY: Walker with assistance





DIET: Regular





DISCHARGE PLAN: Home with hospice DNR/DNI comfort measures only








ITEMS TO FOLLOWUP ON ON OUTPATIENT:


Primary care physician to manage all hospice needs after hospital discharge





DISCHARGE CONDITION: Guarded





TIME SPENT ON DISCHARGE: 30 minutes.





Vital Signs/I&Os





Vital Signs








  Date Time  Temp Pulse Resp B/P (MAP) Pulse Ox O2 Delivery O2 Flow Rate FiO2


 


6/20/21 21:00 98.1 67 17 128/61 (83) 95 Room Air  














I&O- Last 24 Hours up to 6 AM 


 


 6/24/21





 05:59


 


Intake Total 990 ml


 


Output Total 1100 ml


 


Balance -110 ml











Discharge Medications


Scheduled PRN


Hyoscyamine Sulfate (Hyoscyamine Sulfate) 0.125 Mg Tab.subl, 0.125 MG PO Q4HP 

PRN for TERMINAL SECRETIONS


   Use sublingually if unable to swallow 


Lorazepam (Ativan) 0.5 Mg Tablet, 0.5 MG PO Q4HP PRN for ANXIETY/AGITATION


   Use sublingually if unable to swallow 


Morphine Sulfate (Morphine Sulfate) 100 Mg/5 Ml Solution, 0.25-1 ML PO Q2H PRN 

for PAIN OR DYSPNEA


   Use sublingually if unable to swallow 





Allergies


Coded Allergies:  


     simvastatin (Verified  Allergy, Intermediate, unknown, 6/7/21)











JULITO CHO MD            Jun 24, 2021 12:26
